# Patient Record
Sex: FEMALE | Race: WHITE | NOT HISPANIC OR LATINO | Employment: OTHER | URBAN - METROPOLITAN AREA
[De-identification: names, ages, dates, MRNs, and addresses within clinical notes are randomized per-mention and may not be internally consistent; named-entity substitution may affect disease eponyms.]

---

## 2019-02-12 ENCOUNTER — HOSPITAL ENCOUNTER (OUTPATIENT)
Dept: GENERAL RADIOLOGY | Facility: HOSPITAL | Age: 39
Discharge: HOME | End: 2019-02-12

## 2019-04-14 ENCOUNTER — HOSPITAL ENCOUNTER (EMERGENCY)
Facility: HOSPITAL | Age: 39
Discharge: HOME | End: 2019-04-14
Admitting: EMERGENCY MEDICINE

## 2019-04-14 VITALS
WEIGHT: 125 LBS | RESPIRATION RATE: 18 BRPM | HEART RATE: 92 BPM | BODY MASS INDEX: 22.15 KG/M2 | DIASTOLIC BLOOD PRESSURE: 65 MMHG | TEMPERATURE: 98.6 F | SYSTOLIC BLOOD PRESSURE: 110 MMHG | OXYGEN SATURATION: 99 % | HEIGHT: 63 IN

## 2019-04-14 DIAGNOSIS — L02.91 ABSCESS: Primary | ICD-10-CM

## 2019-04-14 LAB
ALBUMIN SERPL-MCNC: 3.16 G/DL (ref 3.5–5.2)
ALBUMIN/GLOB SERPL: 0.8 G/DL
ALP SERPL-CCNC: 114 U/L (ref 39–117)
ALT SERPL W P-5'-P-CCNC: 26 U/L (ref 1–33)
ANION GAP SERPL CALCULATED.3IONS-SCNC: 13.1 MMOL/L
AST SERPL-CCNC: 27 U/L (ref 1–32)
BASOPHILS # BLD AUTO: 0.05 10*3/MM3 (ref 0–0.2)
BASOPHILS NFR BLD AUTO: 0.3 % (ref 0–1.5)
BILIRUB SERPL-MCNC: <0.2 MG/DL (ref 0.2–1.2)
BUN BLD-MCNC: 8 MG/DL (ref 6–20)
BUN/CREAT SERPL: 13.1 (ref 7–25)
CALCIUM SPEC-SCNC: 8.6 MG/DL (ref 8.6–10.5)
CHLORIDE SERPL-SCNC: 102 MMOL/L (ref 98–107)
CO2 SERPL-SCNC: 25.9 MMOL/L (ref 22–29)
CREAT BLD-MCNC: 0.61 MG/DL (ref 0.57–1)
CRP SERPL-MCNC: 7 MG/DL (ref 0–0.5)
DEPRECATED RDW RBC AUTO: 38.8 FL (ref 37–54)
EOSINOPHIL # BLD AUTO: 0.24 10*3/MM3 (ref 0–0.4)
EOSINOPHIL NFR BLD AUTO: 1.5 % (ref 0.3–6.2)
ERYTHROCYTE [DISTWIDTH] IN BLOOD BY AUTOMATED COUNT: 12.1 % (ref 12.3–15.4)
ERYTHROCYTE [SEDIMENTATION RATE] IN BLOOD: 53 MM/HR (ref 0–20)
GFR SERPL CREATININE-BSD FRML MDRD: 110 ML/MIN/1.73
GLOBULIN UR ELPH-MCNC: 3.9 GM/DL
GLUCOSE BLD-MCNC: 83 MG/DL (ref 65–99)
HCT VFR BLD AUTO: 35 % (ref 34–46.6)
HGB BLD-MCNC: 11.7 G/DL (ref 12–15.9)
IMM GRANULOCYTES # BLD AUTO: 0.06 10*3/MM3 (ref 0–0.05)
IMM GRANULOCYTES NFR BLD AUTO: 0.4 % (ref 0–0.5)
LYMPHOCYTES # BLD AUTO: 1.88 10*3/MM3 (ref 0.7–3.1)
LYMPHOCYTES NFR BLD AUTO: 11.5 % (ref 19.6–45.3)
MCH RBC QN AUTO: 30 PG (ref 26.6–33)
MCHC RBC AUTO-ENTMCNC: 33.4 G/DL (ref 31.5–35.7)
MCV RBC AUTO: 89.7 FL (ref 79–97)
MONOCYTES # BLD AUTO: 1.12 10*3/MM3 (ref 0.1–0.9)
MONOCYTES NFR BLD AUTO: 6.8 % (ref 5–12)
NEUTROPHILS # BLD AUTO: 13.01 10*3/MM3 (ref 1.4–7)
NEUTROPHILS NFR BLD AUTO: 79.5 % (ref 42.7–76)
PLATELET # BLD AUTO: 485 10*3/MM3 (ref 140–450)
PMV BLD AUTO: 9.6 FL (ref 6–12)
POTASSIUM BLD-SCNC: 3.5 MMOL/L (ref 3.5–5.2)
PROT SERPL-MCNC: 7.1 G/DL (ref 6–8.5)
RBC # BLD AUTO: 3.9 10*6/MM3 (ref 3.77–5.28)
SODIUM BLD-SCNC: 141 MMOL/L (ref 136–145)
WBC NRBC COR # BLD: 16.36 10*3/MM3 (ref 3.4–10.8)

## 2019-04-14 PROCEDURE — 87040 BLOOD CULTURE FOR BACTERIA: CPT | Performed by: NURSE PRACTITIONER

## 2019-04-14 PROCEDURE — 36415 COLL VENOUS BLD VENIPUNCTURE: CPT

## 2019-04-14 PROCEDURE — 86140 C-REACTIVE PROTEIN: CPT | Performed by: NURSE PRACTITIONER

## 2019-04-14 PROCEDURE — 96372 THER/PROPH/DIAG INJ SC/IM: CPT

## 2019-04-14 PROCEDURE — 85025 COMPLETE CBC W/AUTO DIFF WBC: CPT | Performed by: NURSE PRACTITIONER

## 2019-04-14 PROCEDURE — 80053 COMPREHEN METABOLIC PANEL: CPT | Performed by: NURSE PRACTITIONER

## 2019-04-14 PROCEDURE — 25010000002 PROMETHAZINE PER 50 MG: Performed by: NURSE PRACTITIONER

## 2019-04-14 PROCEDURE — 85652 RBC SED RATE AUTOMATED: CPT | Performed by: NURSE PRACTITIONER

## 2019-04-14 PROCEDURE — 25010000002 HYDROMORPHONE 1 MG/ML SOLUTION: Performed by: NURSE PRACTITIONER

## 2019-04-14 PROCEDURE — 25010000002 CEFTRIAXONE PER 250 MG: Performed by: NURSE PRACTITIONER

## 2019-04-14 PROCEDURE — 99283 EMERGENCY DEPT VISIT LOW MDM: CPT

## 2019-04-14 RX ORDER — SODIUM CHLORIDE 0.9 % (FLUSH) 0.9 %
10 SYRINGE (ML) INJECTION AS NEEDED
Status: DISCONTINUED | OUTPATIENT
Start: 2019-04-14 | End: 2019-04-14

## 2019-04-14 RX ORDER — CEFTRIAXONE 1 G/1
1000 INJECTION, POWDER, FOR SOLUTION INTRAMUSCULAR; INTRAVENOUS ONCE
Status: COMPLETED | OUTPATIENT
Start: 2019-04-14 | End: 2019-04-14

## 2019-04-14 RX ORDER — LIDOCAINE HYDROCHLORIDE 10 MG/ML
2.1 INJECTION, SOLUTION EPIDURAL; INFILTRATION; INTRACAUDAL; PERINEURAL ONCE
Status: COMPLETED | OUTPATIENT
Start: 2019-04-14 | End: 2019-04-14

## 2019-04-14 RX ORDER — HYDROCODONE BITARTRATE AND ACETAMINOPHEN 5; 325 MG/1; MG/1
1 TABLET ORAL EVERY 6 HOURS PRN
Qty: 12 TABLET | Refills: 0 | Status: ON HOLD | OUTPATIENT
Start: 2019-04-14 | End: 2019-12-11

## 2019-04-14 RX ORDER — ONDANSETRON 2 MG/ML
4 INJECTION INTRAMUSCULAR; INTRAVENOUS ONCE
Status: DISCONTINUED | OUTPATIENT
Start: 2019-04-14 | End: 2019-04-14

## 2019-04-14 RX ORDER — SULFAMETHOXAZOLE AND TRIMETHOPRIM 800; 160 MG/1; MG/1
TABLET ORAL
Qty: 40 TABLET | Refills: 0 | Status: ON HOLD | OUTPATIENT
Start: 2019-04-14 | End: 2019-12-11

## 2019-04-14 RX ORDER — OXYCODONE AND ACETAMINOPHEN 10; 325 MG/1; MG/1
1 TABLET ORAL ONCE
Status: COMPLETED | OUTPATIENT
Start: 2019-04-14 | End: 2019-04-14

## 2019-04-14 RX ORDER — PROMETHAZINE HYDROCHLORIDE 25 MG/ML
25 INJECTION, SOLUTION INTRAMUSCULAR; INTRAVENOUS ONCE
Status: COMPLETED | OUTPATIENT
Start: 2019-04-14 | End: 2019-04-14

## 2019-04-14 RX ADMIN — PROMETHAZINE HYDROCHLORIDE 25 MG: 25 INJECTION INTRAMUSCULAR; INTRAVENOUS at 11:44

## 2019-04-14 RX ADMIN — LIDOCAINE HYDROCHLORIDE 2.1 ML: 10 INJECTION, SOLUTION EPIDURAL; INFILTRATION; INTRACAUDAL; PERINEURAL at 13:08

## 2019-04-14 RX ADMIN — OXYCODONE HYDROCHLORIDE AND ACETAMINOPHEN 1 TABLET: 10; 325 TABLET ORAL at 13:08

## 2019-04-14 RX ADMIN — CEFTRIAXONE 1000 MG: 1 INJECTION, POWDER, FOR SOLUTION INTRAMUSCULAR; INTRAVENOUS at 13:07

## 2019-04-14 RX ADMIN — HYDROMORPHONE HYDROCHLORIDE 1 MG: 1 INJECTION, SOLUTION INTRAMUSCULAR; INTRAVENOUS; SUBCUTANEOUS at 11:41

## 2019-04-19 LAB
BACTERIA SPEC AEROBE CULT: NORMAL
BACTERIA SPEC AEROBE CULT: NORMAL

## 2019-12-10 ENCOUNTER — HOSPITAL ENCOUNTER (EMERGENCY)
Facility: HOSPITAL | Age: 39
Discharge: PSYCHIATRIC OR SUBSTANCE USE FACILITY | End: 2019-12-11
Attending: FAMILY MEDICINE | Admitting: FAMILY MEDICINE

## 2019-12-10 VITALS
DIASTOLIC BLOOD PRESSURE: 77 MMHG | SYSTOLIC BLOOD PRESSURE: 121 MMHG | WEIGHT: 130 LBS | TEMPERATURE: 98.9 F | HEART RATE: 82 BPM | BODY MASS INDEX: 23.04 KG/M2 | OXYGEN SATURATION: 100 % | RESPIRATION RATE: 18 BRPM | HEIGHT: 63 IN

## 2019-12-10 DIAGNOSIS — F19.10 POLYSUBSTANCE ABUSE (HCC): Primary | ICD-10-CM

## 2019-12-10 LAB
6-ACETYL MORPHINE: NEGATIVE
ALBUMIN SERPL-MCNC: 3.71 G/DL (ref 3.5–5.2)
ALBUMIN/GLOB SERPL: 1.1 G/DL
ALP SERPL-CCNC: 98 U/L (ref 39–117)
ALT SERPL W P-5'-P-CCNC: 54 U/L (ref 1–33)
AMPHET+METHAMPHET UR QL: POSITIVE
ANION GAP SERPL CALCULATED.3IONS-SCNC: 9.9 MMOL/L (ref 5–15)
AST SERPL-CCNC: 34 U/L (ref 1–32)
B-HCG UR QL: NEGATIVE
BACTERIA UR QL AUTO: ABNORMAL /HPF
BARBITURATES UR QL SCN: NEGATIVE
BASOPHILS # BLD AUTO: 0.07 10*3/MM3 (ref 0–0.2)
BASOPHILS NFR BLD AUTO: 0.8 % (ref 0–1.5)
BENZODIAZ UR QL SCN: NEGATIVE
BILIRUB SERPL-MCNC: 0.2 MG/DL (ref 0.2–1.2)
BILIRUB UR QL STRIP: NEGATIVE
BUN BLD-MCNC: 9 MG/DL (ref 6–20)
BUN/CREAT SERPL: 12.9 (ref 7–25)
BUPRENORPHINE SERPL-MCNC: NEGATIVE NG/ML
CALCIUM SPEC-SCNC: 9 MG/DL (ref 8.6–10.5)
CANNABINOIDS SERPL QL: NEGATIVE
CHLORIDE SERPL-SCNC: 105 MMOL/L (ref 98–107)
CLARITY UR: ABNORMAL
CO2 SERPL-SCNC: 25.1 MMOL/L (ref 22–29)
COCAINE UR QL: NEGATIVE
COLOR UR: YELLOW
CREAT BLD-MCNC: 0.7 MG/DL (ref 0.57–1)
DEPRECATED RDW RBC AUTO: 42.4 FL (ref 37–54)
EOSINOPHIL # BLD AUTO: 0.24 10*3/MM3 (ref 0–0.4)
EOSINOPHIL NFR BLD AUTO: 2.7 % (ref 0.3–6.2)
ERYTHROCYTE [DISTWIDTH] IN BLOOD BY AUTOMATED COUNT: 12.8 % (ref 12.3–15.4)
ETHANOL BLD-MCNC: <10 MG/DL (ref 0–10)
ETHANOL UR QL: <0.01 %
GFR SERPL CREATININE-BSD FRML MDRD: 94 ML/MIN/1.73
GLOBULIN UR ELPH-MCNC: 3.5 GM/DL
GLUCOSE BLD-MCNC: 105 MG/DL (ref 65–99)
GLUCOSE UR STRIP-MCNC: NEGATIVE MG/DL
HCT VFR BLD AUTO: 39.2 % (ref 34–46.6)
HGB BLD-MCNC: 12.7 G/DL (ref 12–15.9)
HGB UR QL STRIP.AUTO: NEGATIVE
HYALINE CASTS UR QL AUTO: ABNORMAL /LPF
IMM GRANULOCYTES # BLD AUTO: 0.02 10*3/MM3 (ref 0–0.05)
IMM GRANULOCYTES NFR BLD AUTO: 0.2 % (ref 0–0.5)
KETONES UR QL STRIP: NEGATIVE
LEUKOCYTE ESTERASE UR QL STRIP.AUTO: ABNORMAL
LYMPHOCYTES # BLD AUTO: 3.19 10*3/MM3 (ref 0.7–3.1)
LYMPHOCYTES NFR BLD AUTO: 36.2 % (ref 19.6–45.3)
MAGNESIUM SERPL-MCNC: 1.9 MG/DL (ref 1.6–2.6)
MCH RBC QN AUTO: 29.1 PG (ref 26.6–33)
MCHC RBC AUTO-ENTMCNC: 32.4 G/DL (ref 31.5–35.7)
MCV RBC AUTO: 89.9 FL (ref 79–97)
METHADONE UR QL SCN: NEGATIVE
MONOCYTES # BLD AUTO: 0.71 10*3/MM3 (ref 0.1–0.9)
MONOCYTES NFR BLD AUTO: 8 % (ref 5–12)
NEUTROPHILS # BLD AUTO: 4.59 10*3/MM3 (ref 1.7–7)
NEUTROPHILS NFR BLD AUTO: 52.1 % (ref 42.7–76)
NITRITE UR QL STRIP: NEGATIVE
NRBC BLD AUTO-RTO: 0 /100 WBC (ref 0–0.2)
OPIATES UR QL: NEGATIVE
OXYCODONE UR QL SCN: NEGATIVE
PCP UR QL SCN: NEGATIVE
PH UR STRIP.AUTO: <=5 [PH] (ref 5–8)
PLATELET # BLD AUTO: 304 10*3/MM3 (ref 140–450)
PMV BLD AUTO: 9.7 FL (ref 6–12)
POTASSIUM BLD-SCNC: 4.3 MMOL/L (ref 3.5–5.2)
PROT SERPL-MCNC: 7.2 G/DL (ref 6–8.5)
PROT UR QL STRIP: NEGATIVE
RBC # BLD AUTO: 4.36 10*6/MM3 (ref 3.77–5.28)
RBC # UR: ABNORMAL /HPF
REF LAB TEST METHOD: ABNORMAL
SODIUM BLD-SCNC: 140 MMOL/L (ref 136–145)
SP GR UR STRIP: 1.02 (ref 1–1.03)
SQUAMOUS #/AREA URNS HPF: ABNORMAL /HPF
UROBILINOGEN UR QL STRIP: ABNORMAL
WBC NRBC COR # BLD: 8.82 10*3/MM3 (ref 3.4–10.8)
WBC UR QL AUTO: ABNORMAL /HPF

## 2019-12-10 PROCEDURE — 80307 DRUG TEST PRSMV CHEM ANLYZR: CPT | Performed by: PHYSICIAN ASSISTANT

## 2019-12-10 PROCEDURE — 99284 EMERGENCY DEPT VISIT MOD MDM: CPT

## 2019-12-10 PROCEDURE — 81025 URINE PREGNANCY TEST: CPT | Performed by: PHYSICIAN ASSISTANT

## 2019-12-10 PROCEDURE — 83735 ASSAY OF MAGNESIUM: CPT | Performed by: PHYSICIAN ASSISTANT

## 2019-12-10 PROCEDURE — 36415 COLL VENOUS BLD VENIPUNCTURE: CPT

## 2019-12-10 PROCEDURE — 80053 COMPREHEN METABOLIC PANEL: CPT | Performed by: PHYSICIAN ASSISTANT

## 2019-12-10 PROCEDURE — 85025 COMPLETE CBC W/AUTO DIFF WBC: CPT | Performed by: PHYSICIAN ASSISTANT

## 2019-12-10 PROCEDURE — 81001 URINALYSIS AUTO W/SCOPE: CPT | Performed by: PHYSICIAN ASSISTANT

## 2019-12-11 ENCOUNTER — HOSPITAL ENCOUNTER (INPATIENT)
Facility: HOSPITAL | Age: 39
LOS: 5 days | Discharge: HOME | End: 2019-12-16
Attending: PSYCHIATRY & NEUROLOGY | Admitting: PSYCHIATRY & NEUROLOGY

## 2019-12-11 PROBLEM — N73.9 PID (PELVIC INFLAMMATORY DISEASE): Status: ACTIVE | Noted: 2019-12-11

## 2019-12-11 PROBLEM — F15.20 METHAMPHETAMINE USE DISORDER, SEVERE (HCC): Status: ACTIVE | Noted: 2019-12-11

## 2019-12-11 PROBLEM — F17.200 TOBACCO USE DISORDER: Status: ACTIVE | Noted: 2019-12-11

## 2019-12-11 PROBLEM — F11.20 OPIOID USE DISORDER, SEVERE, DEPENDENCE: Status: ACTIVE | Noted: 2019-12-11

## 2019-12-11 PROBLEM — F19.20 POLYSUBSTANCE DEPENDENCE: Status: ACTIVE | Noted: 2019-12-11

## 2019-12-11 PROCEDURE — 25010000002 INFLUENZA VAC SUBUNIT QUAD 0.5 ML SUSPENSION PREFILLED SYRINGE: Performed by: PSYCHIATRY & NEUROLOGY

## 2019-12-11 PROCEDURE — 90674 CCIIV4 VAC NO PRSV 0.5 ML IM: CPT | Performed by: PSYCHIATRY & NEUROLOGY

## 2019-12-11 PROCEDURE — 93005 ELECTROCARDIOGRAM TRACING: CPT | Performed by: PSYCHIATRY & NEUROLOGY

## 2019-12-11 PROCEDURE — 93010 ELECTROCARDIOGRAM REPORT: CPT | Performed by: INTERNAL MEDICINE

## 2019-12-11 PROCEDURE — G0008 ADMIN INFLUENZA VIRUS VAC: HCPCS | Performed by: PSYCHIATRY & NEUROLOGY

## 2019-12-11 PROCEDURE — 99223 1ST HOSP IP/OBS HIGH 75: CPT | Performed by: PSYCHIATRY & NEUROLOGY

## 2019-12-11 PROCEDURE — HZ2ZZZZ DETOXIFICATION SERVICES FOR SUBSTANCE ABUSE TREATMENT: ICD-10-PCS | Performed by: PSYCHIATRY & NEUROLOGY

## 2019-12-11 RX ORDER — BENZONATATE 100 MG/1
100 CAPSULE ORAL 3 TIMES DAILY PRN
Status: DISCONTINUED | OUTPATIENT
Start: 2019-12-11 | End: 2019-12-16 | Stop reason: HOSPADM

## 2019-12-11 RX ORDER — ONDANSETRON 4 MG/1
4 TABLET, FILM COATED ORAL EVERY 6 HOURS PRN
Status: DISCONTINUED | OUTPATIENT
Start: 2019-12-11 | End: 2019-12-16 | Stop reason: HOSPADM

## 2019-12-11 RX ORDER — CLONIDINE HYDROCHLORIDE 0.1 MG/1
0.1 TABLET ORAL 3 TIMES DAILY PRN
Status: ACTIVE | OUTPATIENT
Start: 2019-12-12 | End: 2019-12-13

## 2019-12-11 RX ORDER — CLONIDINE HYDROCHLORIDE 0.1 MG/1
0.1 TABLET ORAL 4 TIMES DAILY PRN
Status: ACTIVE | OUTPATIENT
Start: 2019-12-11 | End: 2019-12-12

## 2019-12-11 RX ORDER — ALUMINA, MAGNESIA, AND SIMETHICONE 2400; 2400; 240 MG/30ML; MG/30ML; MG/30ML
15 SUSPENSION ORAL EVERY 6 HOURS PRN
Status: DISCONTINUED | OUTPATIENT
Start: 2019-12-11 | End: 2019-12-16 | Stop reason: HOSPADM

## 2019-12-11 RX ORDER — CYCLOBENZAPRINE HCL 10 MG
10 TABLET ORAL 3 TIMES DAILY PRN
Status: DISPENSED | OUTPATIENT
Start: 2019-12-11 | End: 2019-12-16

## 2019-12-11 RX ORDER — PHENAZOPYRIDINE HYDROCHLORIDE 200 MG/1
200 TABLET, FILM COATED ORAL 2 TIMES DAILY PRN
COMMUNITY
End: 2019-12-16 | Stop reason: HOSPADM

## 2019-12-11 RX ORDER — CLONIDINE HYDROCHLORIDE 0.1 MG/1
0.1 TABLET ORAL 2 TIMES DAILY PRN
Status: ACTIVE | OUTPATIENT
Start: 2019-12-13 | End: 2019-12-14

## 2019-12-11 RX ORDER — METRONIDAZOLE 500 MG/1
500 TABLET ORAL 2 TIMES DAILY
COMMUNITY
End: 2019-12-16 | Stop reason: HOSPADM

## 2019-12-11 RX ORDER — ONDANSETRON 4 MG/1
4 TABLET, ORALLY DISINTEGRATING ORAL EVERY 4 HOURS PRN
COMMUNITY
End: 2019-12-16 | Stop reason: HOSPADM

## 2019-12-11 RX ORDER — BENZTROPINE MESYLATE 1 MG/1
2 TABLET ORAL ONCE AS NEEDED
Status: DISCONTINUED | OUTPATIENT
Start: 2019-12-11 | End: 2019-12-16 | Stop reason: HOSPADM

## 2019-12-11 RX ORDER — METRONIDAZOLE 250 MG/1
500 TABLET ORAL EVERY 12 HOURS SCHEDULED
Status: DISCONTINUED | OUTPATIENT
Start: 2019-12-11 | End: 2019-12-16 | Stop reason: HOSPADM

## 2019-12-11 RX ORDER — TRAZODONE HYDROCHLORIDE 50 MG/1
50 TABLET ORAL NIGHTLY PRN
Status: DISCONTINUED | OUTPATIENT
Start: 2019-12-11 | End: 2019-12-16 | Stop reason: HOSPADM

## 2019-12-11 RX ORDER — BENZTROPINE MESYLATE 1 MG/ML
1 INJECTION INTRAMUSCULAR; INTRAVENOUS ONCE AS NEEDED
Status: DISCONTINUED | OUTPATIENT
Start: 2019-12-11 | End: 2019-12-16 | Stop reason: HOSPADM

## 2019-12-11 RX ORDER — CLONIDINE HYDROCHLORIDE 0.1 MG/1
0.1 TABLET ORAL DAILY PRN
Status: ACTIVE | OUTPATIENT
Start: 2019-12-14 | End: 2019-12-15

## 2019-12-11 RX ORDER — DOXYCYCLINE 100 MG/1
100 CAPSULE ORAL EVERY 12 HOURS SCHEDULED
Status: DISCONTINUED | OUTPATIENT
Start: 2019-12-11 | End: 2019-12-16 | Stop reason: HOSPADM

## 2019-12-11 RX ORDER — ECHINACEA PURPUREA EXTRACT 125 MG
2 TABLET ORAL AS NEEDED
Status: DISCONTINUED | OUTPATIENT
Start: 2019-12-11 | End: 2019-12-16 | Stop reason: HOSPADM

## 2019-12-11 RX ORDER — HYDROXYZINE 50 MG/1
50 TABLET, FILM COATED ORAL EVERY 6 HOURS PRN
Status: DISCONTINUED | OUTPATIENT
Start: 2019-12-11 | End: 2019-12-16 | Stop reason: HOSPADM

## 2019-12-11 RX ORDER — DICYCLOMINE HYDROCHLORIDE 10 MG/1
10 CAPSULE ORAL 3 TIMES DAILY PRN
Status: DISPENSED | OUTPATIENT
Start: 2019-12-11 | End: 2019-12-16

## 2019-12-11 RX ORDER — IBUPROFEN 400 MG/1
400 TABLET ORAL EVERY 6 HOURS PRN
Status: DISCONTINUED | OUTPATIENT
Start: 2019-12-11 | End: 2019-12-16 | Stop reason: HOSPADM

## 2019-12-11 RX ORDER — PHENAZOPYRIDINE HYDROCHLORIDE 200 MG/1
200 TABLET, FILM COATED ORAL 2 TIMES DAILY PRN
Status: DISCONTINUED | OUTPATIENT
Start: 2019-12-11 | End: 2019-12-16 | Stop reason: HOSPADM

## 2019-12-11 RX ORDER — DOXYCYCLINE HYCLATE 100 MG
100 TABLET ORAL 2 TIMES DAILY
COMMUNITY
End: 2019-12-16 | Stop reason: HOSPADM

## 2019-12-11 RX ORDER — FAMOTIDINE 20 MG/1
20 TABLET, FILM COATED ORAL 2 TIMES DAILY PRN
Status: DISCONTINUED | OUTPATIENT
Start: 2019-12-11 | End: 2019-12-16 | Stop reason: HOSPADM

## 2019-12-11 RX ORDER — LOPERAMIDE HYDROCHLORIDE 2 MG/1
2 CAPSULE ORAL
Status: DISCONTINUED | OUTPATIENT
Start: 2019-12-11 | End: 2019-12-16 | Stop reason: HOSPADM

## 2019-12-11 RX ADMIN — DICYCLOMINE HYDROCHLORIDE 10 MG: 10 CAPSULE ORAL at 00:54

## 2019-12-11 RX ADMIN — ONDANSETRON HYDROCHLORIDE 4 MG: 4 TABLET, FILM COATED ORAL at 21:11

## 2019-12-11 RX ADMIN — DOXYCYCLINE 100 MG: 100 CAPSULE ORAL at 21:10

## 2019-12-11 RX ADMIN — INFLUENZA A VIRUS A/SINGAPORE/GP1908/2015 IVR-180 (H1N1) ANTIGEN (MDCK CELL DERIVED, PROPIOLACTONE INACTIVATED), INFLUENZA A VIRUS A/NORTH CAROLINA/04/2016 (H3N2) HEMAGGLUTININ ANTIGEN (MDCK CELL DERIVED, PROPIOLACTONE INACTIVATED), INFLUENZA B VIRUS B/IOWA/06/2017 HEMAGGLUTININ ANTIGEN (MDCK CELL DERIVED, PROPIOLACTONE INACTIVATED), INFLUENZA B VIRUS B/SINGAPORE/INFTT-16-0610/2016 HEMAGGLUTININ ANTIGEN (MDCK CELL DERIVED, PROPIOLACTONE INACTIVATED) 0.5 ML: 15; 15; 15; 15 INJECTION, SUSPENSION INTRAMUSCULAR at 15:04

## 2019-12-11 RX ADMIN — IBUPROFEN 400 MG: 400 TABLET, FILM COATED ORAL at 21:11

## 2019-12-11 RX ADMIN — IBUPROFEN 400 MG: 400 TABLET, FILM COATED ORAL at 09:06

## 2019-12-11 RX ADMIN — DICYCLOMINE HYDROCHLORIDE 10 MG: 10 CAPSULE ORAL at 09:06

## 2019-12-11 RX ADMIN — METRONIDAZOLE 500 MG: 250 TABLET ORAL at 21:10

## 2019-12-11 RX ADMIN — HYDROXYZINE HYDROCHLORIDE 50 MG: 50 TABLET ORAL at 00:54

## 2019-12-11 RX ADMIN — IBUPROFEN 400 MG: 400 TABLET, FILM COATED ORAL at 15:05

## 2019-12-11 RX ADMIN — TRAZODONE HYDROCHLORIDE 50 MG: 50 TABLET ORAL at 00:54

## 2019-12-11 RX ADMIN — ONDANSETRON HYDROCHLORIDE 4 MG: 4 TABLET, FILM COATED ORAL at 00:54

## 2019-12-11 RX ADMIN — TRAZODONE HYDROCHLORIDE 50 MG: 50 TABLET ORAL at 21:12

## 2019-12-11 NOTE — NURSING NOTE
Phone contact dr ac. Presented assess/clinicals/labs. Orders received. Admit to [facility]. Routine orders. Clonidine detox admission day today. Orders read back and confirmed.

## 2019-12-11 NOTE — PROGRESS NOTES
"DATA:         Therapist met individually with patient this date to introduce role and to discuss hospitalization expectations. Patient agreeable.     Roro is a 39 year old single,  female [place].  She presents as voluntary admit requesting detox from suboxone and meth.  Patient reports she has been smoking and snorting meth of 1 gram daily and suboxone 1 pill daily for past two years.  She reports motivation for sobriety to be her father passing away one week ago and that he was unable to see her sober.  Patient reports substance abuse problem since age 31.  She denies history of detox treatment.  Patient discussed stressor of being around drug culture, financial strain, limited support system.  She is 8th grade educated and disabled.  Patient denied legal issues.  She reports \"horrible\" withdrawal symptoms of \"hurting all over,\" sweating, feeling hot/cold, nausea, diarrhea, fatigue, body aches, headaches, agitation, anxiety, and strong cravings for suboxone.  Patient's initial COWS score was 13.  She denied SI, HI, and AVH.  Patient reports history of depression and anxiety.  She denies any acute treatment.  Patient oriented x3 with poor insight and poor impulse control.  She reports plan to attend rehab upon discharge.  Patient admitted for detox regime.       Clinical Maneuvering/Intervention:     Therapist assisted patient in processing above session content; acknowledged and normalized patient’s thoughts, feelings, and concerns.  Discussed the therapist/patient relationship and explain the parameters and limitations of relative confidentiality.  Also discussed the importance active participation, and honesty to the treatment process.  Encouraged the patient to discuss/vent her feelings, frustrations, and fears concerning her ongoing medical issues and validated her feelings.     Discussed the importance of finding enjoyable activities and coping skills that the patient can engage " in a regular basis. Discussed healthy coping skills such as distraction, self love, grounding, thought challenges/reframing, etc.  Provided patient with list of healthy coping skills this date. Discussed the importance of medication compliance.  Praised the patient for seeking help and spent the majority of the session building rapport.       Allowed patient to freely discuss issues without interruption or judgment. Provided safe, confidential environment to facilitate the development of positive therapeutic relationship and encourage open, honest communication.      Therapist addressed discharge safety planning this date. Assisted patient in identifying risk factors which would indicate the need for higher level of care after discharge;  including thoughts to harm self or others and/or self-harming behavior. Encouraged patient to call 911, or present to the nearest emergency room should any of these events occur. Discussed crisis intervention services and means to access.  Encouraged securing any objects of harm.       Therapist completed integrated summary, treatment plan, and initiated social history this date.  Therapist is strongly encouraging family involvement in treatment.      Patient agreeable to involve boyfriend with treatment and attempted to contact in session, however appeared his phone is disconnected at this time.  Patient stated he does not have money to put minutes on his phone.  She declined to contact any family members.     ASSESSMENT:      Patient displayed labile affect and irritable mood.  She displayed pressured, rapid speech and poor insight.  She reported poor sleep and poor appetite.     PLAN:       Patient to remain hospitalized this date.     Treatment team will focus efforts on stabilizing patient's acute symptoms while providing education on healthy coping and crisis management to reduce hospitalizations.   Patient requires daily psychiatrist evaluation and 24/7 nursing supervision  to promote patient  safety.     Therapist will offer 1-4 individual sessions (20-30 minutes each), 1 therapy group daily, family education, and appropriate referral.    Patient consented for [facility] referral.

## 2019-12-11 NOTE — NURSING NOTE
Pt status remains unchanged. Given warm blanket, sandwich snack and Gatorade. Continue to monitor.

## 2019-12-11 NOTE — ED PROVIDER NOTES
Subjective     History provided by:  Patient   used: No    Mental Health Problem   Presenting symptoms comment:  Pt wants to seek detox from meth and suboxone. Pt has a bed at [facility]. Denies SI/HI,AVH.  Timing:  Constant  Progression:  Worsening  Chronicity:  New  Context: drug abuse    Associated symptoms: anxiety    Risk factors: hx of mental illness        Review of Systems   Constitutional: Negative.    HENT: Negative.    Eyes: Negative.    Respiratory: Negative.    Cardiovascular: Negative.    Gastrointestinal: Negative.    Endocrine: Negative.    Genitourinary: Negative.    Musculoskeletal: Negative.    Skin: Negative.    Allergic/Immunologic: Negative.    Neurological: Negative.    Hematological: Negative.    Psychiatric/Behavioral: The patient is nervous/anxious.    All other systems reviewed and are negative.      Past Medical History:   Diagnosis Date   • PID (pelvic inflammatory disease)    • Substance abuse (CMS/HCC)    • Withdrawal symptoms, drug or narcotic (CMS/HCC)        No Known Allergies    Past Surgical History:   Procedure Laterality Date   •  SECTION     • TUBAL ABDOMINAL LIGATION         Family History   Problem Relation Age of Onset   • Drug abuse Sister    • Drug abuse Brother        Social History     Socioeconomic History   • Marital status: Legally      Spouse name: Not on file   • Number of children: Not on file   • Years of education: Not on file   • Highest education level: Not on file   Tobacco Use   • Smoking status: Current Every Day Smoker     Packs/day: 1.00     Years: 24.00     Pack years: 24.00     Types: Cigarettes   • Smokeless tobacco: Never Used   Substance and Sexual Activity   • Alcohol use: Never     Frequency: Never   • Drug use: Yes     Types: Methamphetamines, Other     Comment: suboxone   • Sexual activity: Yes     Partners: Male           Objective   Physical Exam   Constitutional: She is oriented to person, place,  and time. She appears well-developed and well-nourished.   HENT:   Head: Normocephalic and atraumatic.   Right Ear: External ear normal.   Left Ear: External ear normal.   Nose: Nose normal.   Mouth/Throat: Oropharynx is clear and moist.   Eyes: Pupils are equal, round, and reactive to light. Conjunctivae and EOM are normal.   Neck: Normal range of motion. Neck supple.   Cardiovascular: Normal rate, regular rhythm, normal heart sounds and intact distal pulses.   Pulmonary/Chest: Effort normal and breath sounds normal.   Abdominal: Soft. Bowel sounds are normal.   Musculoskeletal: Normal range of motion.   Neurological: She is alert and oriented to person, place, and time.   Skin: Skin is warm and dry. Capillary refill takes less than 2 seconds.   Psychiatric: Her speech is normal and behavior is normal. Judgment and thought content normal. Her mood appears anxious. Cognition and memory are normal.   Nursing note and vitals reviewed.      Procedures           ED Course                      No data recorded                        MDM    Final diagnoses:   Polysubstance abuse (CMS/[facility])              Candelaria Shepherd PA  12/11/19 0244

## 2019-12-11 NOTE — NURSING NOTE
"Presented to ED requesting detox.  Reports that she has had an addiction problem since she was 31.  Reports experiencing several traumas that year that led to substance abuse.  Has been using meth and suboxone for the last 2 years.  Reports that she smokes both meth and suboxone-suboxone 1 pill daily, and meth up to a gram daily.  Reports that her father passed away last week and all he wanted was to see her clean.  Also, reports that she has grandchildren, and she wants to be involved in their lives, and her significant other isn't \"going to put up with\" her substance abuse any longer.  Denies any hx of detox admissions.  Plans to go to [facility] upon discharge.    "

## 2019-12-11 NOTE — PLAN OF CARE
Problem: Patient Care Overview  Goal: Plan of Care Review  Outcome: Ongoing (interventions implemented as appropriate)  Flowsheets (Taken 12/11/2019 0305)  Progress: no change  Plan of Care Reviewed With: patient  Patient Agreement with Plan of Care: agrees  Outcome Summary: Pt new admit this shift. No change. 12/11/19 0303
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
## 2019-12-11 NOTE — NURSING NOTE
Spoke with david in pharmacy regarding medication verification. She states that they will call [facility] and inquire on scripts.

## 2019-12-11 NOTE — PLAN OF CARE
Problem: Patient Care Overview  Goal: Plan of Care Review  Outcome: Ongoing (interventions implemented as appropriate)  Flowsheets (Taken 12/11/2019 1433)  Plan of Care Reviewed With: patient  Patient Agreement with Plan of Care: agrees  Outcome Summary: Completed initial assessment, discussed alternative aftercare resources and expectations of treatment; reviewed treatment plan.     Problem: Patient Care Overview  Goal: Individualization and Mutuality  Outcome: Ongoing (interventions implemented as appropriate)  Flowsheets (Taken 12/11/2019 1433)  Patient Personal Strengths: expressive of emotions; expressive of needs; family/social support; motivated for treatment; resilient; resourceful  Patient Vulnerabilities: Ineffective coping skills, poor insight, substance abuse.  Patient Specific Goals (Include Timeframe): Patient to identify 3 relapse triggers, 3 healthy coping skills, complete relapse prevention plan, and complete detox regime.  Patient Specific Interventions: Patient to access psychiatric evaluation, medication management, individual and group CBT therapy sessions.  What Information Would Help Us Give You More Personalized Care?: None  What Anxieties, Fears, Concerns, or Questions Do You Have About Your Care?: None  Patient Specific Preferences: Detox regime.     Problem: Patient Care Overview  Goal: Discharge Needs Assessment  Outcome: Ongoing (interventions implemented as appropriate)  Flowsheets (Taken 12/11/2019 1433)  Outpatient/Agency/Support Group Needs: residential services  Discharge Coordination/Progress: Patient anticipated to attend [facility] upon discharge.  She has insurance for medications.  Transportation Anticipated: public transportation  Anticipated Discharge Disposition: inpatient rehab facility  Current Discharge Risk: psychiatric illness; substance use/abuse; financial support inadequate  Concerns to be Addressed: coping/stress; decision making; discharge planning;  substance/tobacco abuse/use; medication; mental health; financial/insurance  Readmission Within the Last 30 Days: no previous admission in last 30 days  Patient/Family Anticipated Services at Transition: rehabilitation services  Patient's Choice of Community Agency(s): Anticipate rehab referral to [facility].  Patient/Family Anticipates Transition to: inpatient rehabilitation facility     Problem: Patient Care Overview  Goal: Interprofessional Rounds/Family Conf  Outcome: Ongoing (interventions implemented as appropriate)  Flowsheets (Taken 12/11/2019 1433)  Participants: social work; psychiatrist; milieu/psych techs; nursing  Summary: Therapist to staff patient's  case with treatment team during admission.     Problem: Overarching Goals (Adult)  Goal: Adheres to Safety Considerations for Self and Others  Outcome: Ongoing (interventions implemented as appropriate)     Problem: Overarching Goals (Adult)  Goal: Adheres to Safety Considerations for Self and Others  Intervention: Develop and Maintain Individualized Safety Plan  Flowsheets (Taken 12/11/2019 1433)  Safety Measures: clinical history reviewed     Problem: Overarching Goals (Adult)  Goal: Optimized Coping Skills in Response to Life Stressors  Outcome: Ongoing (interventions implemented as appropriate)     Problem: Overarching Goals (Adult)  Goal: Optimized Coping Skills in Response to Life Stressors  Intervention: Promote Effective Coping Strategies  Flowsheets (Taken 12/11/2019 1433)  Supportive Measures: active listening utilized; counseling provided; goal setting facilitated; problem solving facilitated; relaxation techniques promoted; verbalization of feelings encouraged; self-responsibility promoted; self-reflection promoted; self-care encouraged     Problem: Overarching Goals (Adult)  Goal: Develops/Participates in Therapeutic [place] to Support Successful Transition  Outcome: Ongoing (interventions implemented as appropriate)     Problem:  Overarching Goals (Adult)  Goal: Develops/Participates in Therapeutic [place] to Support Successful Transition  Intervention: Foster Therapeutic [place]  Flowsheets (Taken 12/11/2019 1433)  Trust Relationship/Rapport: emotional support provided; empathic listening provided; reassurance provided; questions encouraged; questions answered; thoughts/feelings acknowledged     Problem: Overarching Goals (Adult)  Goal: Develops/Participates in Therapeutic [place] to Support Successful Transition  Intervention: Mutually Develop Transition Plan  Flowsheets (Taken 12/11/2019 1433)  Transition Support: community resources reviewed; crisis management plan promoted; crisis management plan verbalized; follow-up care discussed; follow-up care coordinated     Problem: Impaired Control (Excessive Substance Use) (Adult)  Goal: Participates in Recovery Program (Excessive Substance Use)  Outcome: Ongoing (interventions implemented as appropriate)     Problem: Impaired Control (Excessive Substance Use) (Adult)  Intervention: Promote Optimal Psychological Functioning  Flowsheets (Taken 12/11/2019 1433)  Mutually Determined Action Steps (Promote Optimal Psychological Functioning): discusses ongoing recovery plan; identifies support resources; identifies past trauma episode  Trust Relationship/Rapport: emotional support provided; empathic listening provided; reassurance provided; questions encouraged; questions answered; thoughts/feelings acknowledged

## 2019-12-11 NOTE — NURSING NOTE
Pt presents to intake requesting detox off suboxone and meth. States she contacted [facility] today and they have voucher from [employer] to pay for her rehab, but she has to complete detox prog first. They referred her here.   Pt searched per staff, placed in hosp scrubs, and belongings secured and placed in cabinet.   Pt in rm 5, monitored for safety checks.   Advised of delay in assess process due to high volume of pts at this time. NAD noted or reported.

## 2019-12-12 PROCEDURE — 99232 SBSQ HOSP IP/OBS MODERATE 35: CPT | Performed by: PSYCHIATRY & NEUROLOGY

## 2019-12-12 RX ADMIN — DICYCLOMINE HYDROCHLORIDE 10 MG: 10 CAPSULE ORAL at 21:26

## 2019-12-12 RX ADMIN — CYCLOBENZAPRINE 10 MG: 10 TABLET, FILM COATED ORAL at 21:26

## 2019-12-12 RX ADMIN — DOXYCYCLINE 100 MG: 100 CAPSULE ORAL at 08:29

## 2019-12-12 RX ADMIN — HYDROXYZINE HYDROCHLORIDE 50 MG: 50 TABLET ORAL at 21:26

## 2019-12-12 RX ADMIN — IBUPROFEN 400 MG: 400 TABLET, FILM COATED ORAL at 21:26

## 2019-12-12 RX ADMIN — METRONIDAZOLE 500 MG: 250 TABLET ORAL at 08:28

## 2019-12-12 RX ADMIN — IBUPROFEN 400 MG: 400 TABLET, FILM COATED ORAL at 08:30

## 2019-12-12 RX ADMIN — METRONIDAZOLE 500 MG: 250 TABLET ORAL at 21:25

## 2019-12-12 RX ADMIN — DOXYCYCLINE 100 MG: 100 CAPSULE ORAL at 21:26

## 2019-12-12 RX ADMIN — TRAZODONE HYDROCHLORIDE 50 MG: 50 TABLET ORAL at 21:26

## 2019-12-12 NOTE — PLAN OF CARE
Problem: Patient Care Overview  Goal: Plan of Care Review  Outcome: Ongoing (interventions implemented as appropriate)  Flowsheets (Taken 12/12/2019 0105)  Progress: improving  Plan of Care Reviewed With: patient  Patient Agreement with Plan of Care: agrees  Note:   Patient calm and cooperative. Denies anxiety, depression, SI,HI, Castillo., cravings, or withdrawal symptoms. Patient c/o aching and cramping in her stomach 7/10. No other issues noted at this time. Will continue to monitor.

## 2019-12-12 NOTE — PROGRESS NOTES
"INPATIENT PSYCHIATRIC PROGRESS NOTE    Name:  Roro French  :  1980  MRN:  [id number]  Visit Number:  [id number]  Length of stay:  1    SUBJECTIVE  CC/Focus of Exam: withdrawals    INTERVAL HISTORY:  The patient states she is feeling better and the detox medications are helping.   Depression rating 10  Anxiety rating 1/10  Sleep: good  Withdrawal sx: denies  Cravin/10 for anything    Review of Systems   Constitutional: Negative.    Respiratory: Negative.    Cardiovascular: Negative.    Gastrointestinal: Negative.        OBJECTIVE    Temp:  [97.8 °F (36.6 °C)-98.7 °F (37.1 °C)] 98.1 °F (36.7 °C)  Heart Rate:  [] 109  Resp:  [16-18] 18  BP: ()/(53-79) 112/74    MENTAL STATUS EXAM:  Appearance:Casually dressed, good hygeine.   Cooperation:Cooperative  Psychomotor: No psychomotor agitation/retardation, No EPS, No motor tics  Speech-normal rate, amount.  Mood \"good\"   Affect-congruent, appropriate, stable  Thought Content-goal directed, no delusional material present  Thought process-linear, organized.  Suicidality: No SI  Homicidality: No HI  Perception: No AH/VH  Insight-fair   Judgement-fair    Lab Results (last 24 hours)     ** No results found for the last 24 hours. **             Imaging Results (Last 24 Hours)     ** No results found for the last 24 hours. **             ECG/EMG Results (most recent)     Procedure Component Value Units Date/Time    ECG 12 Lead [[id number]] Collected:  19     Updated:  19    Narrative:       Test Reason : Baseline Cardiac Status  Blood Pressure : **/** mmHG  Vent. Rate : 073 BPM     Atrial Rate : 073 BPM     P-R Int : 140 ms          QRS Dur : 078 ms      QT Int : 392 ms       P-R-T Axes : 078 050 063 degrees     QTc Int : 431 ms    Normal sinus rhythm with sinus arrhythmia  Normal ECG  No previous ECGs available  Confirmed by Brando Hunt ([id number]) on 2019 7:22:48 PM    Referred By:             Confirmed By:Brando Hunt       "     ALLERGIES: Patient has no known allergies.      Current Facility-Administered Medications:   •  aluminum-magnesium hydroxide-simethicone (MAALOX MAX) 400-400-40 MG/5ML suspension 15 mL, 15 mL, Oral, Q6H PRN, Melissa Husain MD  •  benzonatate (TESSALON) capsule 100 mg, 100 mg, Oral, TID PRN, Melissa Husain MD  •  benztropine (COGENTIN) tablet 2 mg, 2 mg, Oral, Once PRN **OR** benztropine (COGENTIN) injection 1 mg, 1 mg, Intramuscular, Once PRN, Melissa Husain MD  •  [] cloNIDine (CATAPRES) tablet 0.1 mg, 0.1 mg, Oral, 4x Daily PRN **FOLLOWED BY** cloNIDine (CATAPRES) tablet 0.1 mg, 0.1 mg, Oral, TID PRN **FOLLOWED BY** [START ON 2019] cloNIDine (CATAPRES) tablet 0.1 mg, 0.1 mg, Oral, BID PRN **FOLLOWED BY** [START ON 2019] cloNIDine (CATAPRES) tablet 0.1 mg, 0.1 mg, Oral, Daily PRN, Melissa Husain MD  •  cyclobenzaprine (FLEXERIL) tablet 10 mg, 10 mg, Oral, TID PRN, Melissa Husain MD  •  dicyclomine (BENTYL) capsule 10 mg, 10 mg, Oral, TID PRN, Melissa Husain MD, 10 mg at 19 0906  •  doxycycline (MONODOX) capsule 100 mg, 100 mg, Oral, Q12H, Melissa Husain MD, 100 mg at 19 0829  •  famotidine (PEPCID) tablet 20 mg, 20 mg, Oral, BID PRN, Melissa Husain MD  •  hydrOXYzine (ATARAX) tablet 50 mg, 50 mg, Oral, Q6H PRN, Melissa Husain MD, 50 mg at 19 0054  •  ibuprofen (ADVIL,MOTRIN) tablet 400 mg, 400 mg, Oral, Q6H PRN, Melissa Husain MD, 400 mg at 19 0830  •  loperamide (IMODIUM) capsule 2 mg, 2 mg, Oral, Q2H PRN, Melissa Husain MD  •  magnesium hydroxide (MILK OF MAGNESIA) suspension 2400 mg/10mL 10 mL, 10 mL, Oral, Daily PRN, Melissa Husain MD  •  metroNIDAZOLE (FLAGYL) tablet 500 mg, 500 mg, Oral, Q12H, Melissa Husain MD, 500 mg at 19 0828  •  ondansetron (ZOFRAN) tablet 4 mg, 4 mg, Oral, Q6H PRN, Melissa Husani MD, 4 mg at 19  •  phenazopyridine (PYRIDIUM) tablet 200 mg, 200 mg, Oral, BID PRN, Melissa Husain MD  •  sodium chloride nasal spray 2 spray, 2  spray, Each Nare, Lisha TRIPP Mazhar, MD  •  traZODone (DESYREL) tablet 50 mg, 50 mg, Oral, Nightly PRN, Melissa Husain MD, 50 mg at 12/11/19 2112    ASSESSMENT & PLAN:      Opioid use disorder, severe, dependence (CMS/HCC)  - Clonidine detox      Methamphetamine use disorder, severe (CMS/HCC)  - Supportive treatment      PID (pelvic inflammatory disease)  - Flagyl, Doxycycline      Tobacco use disorder  - Encouraged patient avoid and quit tobacco use.      THC use disorder  - Supportive treatment    Suicide precautions: Suicide precuation Level 4 (q30 min checks)    Behavioral Health Treatment Plan and Problem List: I have reviewed and approved the Behavioral Health Treatment Plan and Problem list.  The patient has had a chance to review and agrees with the treatment plan.     Clinician:  Melissa Husain MD  12/12/19  1:55 PM

## 2019-12-12 NOTE — PLAN OF CARE
"  Problem: Patient Care Overview  Goal: Interprofessional Rounds/Family Conf  Outcome: Ongoing (interventions implemented as appropriate)  Flowsheets (Taken 2019 1608)  Participants: social work; nursing  Summary: Staffed with RN.     Problem: Overarching Goals (Adult)  Goal: Optimized Coping Skills in Response to Life Stressors  Intervention: Promote Effective Coping Strategies  Flowsheets (Taken 2019 1608)  Supportive Measures: self-reflection promoted; self-responsibility promoted; relaxation techniques promoted; self-care encouraged; verbalization of feelings encouraged       DATA:         Therapist met individually with patient this date for inpatient follow up from initial assessment yesterday.  Continued to discuss progress with treatment.  Therapist reviewed patient's chart and provided education on resources for aftercare.  Discussed disposition planning.  Patient appeared agitated and demanding.  She reported having \"awful\" withdrawal symptoms of body aches, feeling hot/cold, nausea, diarrhea, headache, fatigue, poor sleep.  Patient focused on obtaining Subutex and inquired many times.  She reported she did not start feeling this bad until this afternoon.  Patient noted to remain in room most of morning.  Therapist provided emotional support and encouraged patient to complete treatment.  Patient stated \"I know if I leave AMA I'll relapse and [facility].\"  She seemed very easily frustrated with pressured speech.  Therapist encouraged patient to focus on her motivations for coming to detox.  Patient stated she wants to get sober for her dad who  recently but is having a hard time getting through withdrawal symptoms.  Therapist assisted patient to identify and process healthy coping skills and utilize mindfulness techniques; patient somewhat receptive.  She reports plan to attend [facility] upon discharge.        Clinical Maneuvering/Intervention:     Therapist assisted " patient in processing above session content; acknowledged and normalized patient’s thoughts, feelings, and concerns.  Discussed the therapist/patient relationship and explain the parameters and limitations of relative confidentiality.  Also discussed the importance active participation, and honesty to the treatment process.  Encouraged the patient to discuss/vent her feelings, frustrations, and fears concerning ongoing medical issues and validated patient's feelings.     Discussed the importance of finding enjoyable activities and coping skills that the patient can engage in a regular basis. Discussed healthy coping skills such as distraction, self love, grounding, thought challenges/reframing, etc.  Provided patient with list of healthy coping skills this date. Discussed the importance of medication compliance.       Allowed patient to freely discuss issues without interruption or judgment. Provided safe, confidential environment to facilitate the development of positive therapeutic relationship and encourage open, honest communication.       ASSESSMENT:      Patient appeared to display labile affect and irritable mood.  She displayed poor insight.  She denied SI, HI, and AVH.  Patient oriented x3 and reported poor sleep.     PLAN:       Patient to remain hospitalized this date.  Patient has aftercare with [facility].

## 2019-12-13 PROCEDURE — 99232 SBSQ HOSP IP/OBS MODERATE 35: CPT | Performed by: PSYCHIATRY & NEUROLOGY

## 2019-12-13 RX ORDER — BUPRENORPHINE 2 MG/1
2 TABLET SUBLINGUAL 2 TIMES DAILY
Status: COMPLETED | OUTPATIENT
Start: 2019-12-14 | End: 2019-12-14

## 2019-12-13 RX ORDER — BUPRENORPHINE 2 MG/1
2 TABLET SUBLINGUAL DAILY
Status: COMPLETED | OUTPATIENT
Start: 2019-12-15 | End: 2019-12-15

## 2019-12-13 RX ORDER — BUPRENORPHINE 2 MG/1
2 TABLET SUBLINGUAL 2 TIMES DAILY
Status: COMPLETED | OUTPATIENT
Start: 2019-12-13 | End: 2019-12-13

## 2019-12-13 RX ADMIN — IBUPROFEN 400 MG: 400 TABLET, FILM COATED ORAL at 21:16

## 2019-12-13 RX ADMIN — ONDANSETRON HYDROCHLORIDE 4 MG: 4 TABLET, FILM COATED ORAL at 17:00

## 2019-12-13 RX ADMIN — METRONIDAZOLE 500 MG: 250 TABLET ORAL at 21:14

## 2019-12-13 RX ADMIN — DOXYCYCLINE 100 MG: 100 CAPSULE ORAL at 08:11

## 2019-12-13 RX ADMIN — BUPRENORPHINE HCL 2 MG: 2 TABLET SUBLINGUAL at 13:29

## 2019-12-13 RX ADMIN — BUPRENORPHINE HCL 2 MG: 2 TABLET SUBLINGUAL at 21:17

## 2019-12-13 RX ADMIN — METRONIDAZOLE 500 MG: 250 TABLET ORAL at 08:11

## 2019-12-13 RX ADMIN — DOXYCYCLINE 100 MG: 100 CAPSULE ORAL at 21:14

## 2019-12-13 NOTE — PLAN OF CARE
Problem: Patient Care Overview  Goal: Plan of Care Review  Outcome: Ongoing (interventions implemented as appropriate)  Flowsheets (Taken 12/13/2019 0615)  Progress: improving  Plan of Care Reviewed With: patient  Patient Agreement with Plan of Care: agrees  Outcome Summary: Pt reports anxiety 8/10, depression 4/10, cravings 9/10, body ache, hot/cold flashes, and sweating. Tremors +2.  Participated in group and slept well.

## 2019-12-13 NOTE — PLAN OF CARE
Problem: Patient Care Overview  Goal: Plan of Care Review  Outcome: Ongoing (interventions implemented as appropriate)  Flowsheets (Taken 12/13/2019 6462)  Consent Given to Review Plan with: Patient has been out of room most of the day, sleeeping/eating good, anxiety 5, depression 0, denies S/I, H/I & A/V/H ideations.  Cravings o, has w/d cramping, sweating, tossing and turning.  Progress: no change  Plan of Care Reviewed With: patient  Patient Agreement with Plan of Care: agrees

## 2019-12-13 NOTE — PLAN OF CARE
DATA:         Therapist met individually with patient this date for inpatient follow up.  Continued to discuss progress with treatment.  Therapist reviewed patient's chart and provided education on resources for aftercare.  Discussed disposition planning.  Patient reported moderate withdrawal symptoms today.  She seemed less agitated.  She reported withdrawal symptoms of restlessness, body aches , feeling hot/cold, nausea.  Patient reported poor sleep.  She seemed to have some difficulty concentrating.  Patient discussed motivation for rehab to be her  father.  She reported healthy coping skills of exercise and talking to trusted friend.  Therapist reviewed that patient is accepted to [facility] on Monday; patient agreeable.     Clinical Maneuvering/Intervention:     Therapist assisted patient in processing above session content; acknowledged and normalized patient’s thoughts, feelings, and concerns.  Discussed the therapist/patient relationship and explain the parameters and limitations of relative confidentiality.  Also discussed the importance active participation, and honesty to the treatment process.  Encouraged the patient to discuss/vent her feelings, frustrations, and fears concerning ongoing medical issues and validated patient's feelings.     Discussed the importance of finding enjoyable activities and coping skills that the patient can engage in a regular basis. Discussed healthy coping skills such as distraction, self love, grounding, thought challenges/reframing, etc.  Provided patient with list of healthy coping skills this date. Discussed the importance of medication compliance.       Allowed patient to freely discuss issues without interruption or judgment. Provided safe, confidential environment to facilitate the development of positive therapeutic relationship and encourage open, honest communication.       ASSESSMENT:      Patient appeared to display restricted affect and anxious mood.   She denied SI, HI, and AVH.  Patient oriented x3 with limited insight.  She reported poor sleep.     PLAN:       Patient to remain hospitalized this date.  Patient has aftercare with [facility] on Monday and they will transport upon discharge.

## 2019-12-13 NOTE — PROGRESS NOTES
"INPATIENT PSYCHIATRIC PROGRESS NOTE    Name:  Roro French  :  1980  MRN:  [id number]  Visit Number:  [id number]  Length of stay:  2    SUBJECTIVE  CC/Focus of Exam: opioid withdrawals    INTERVAL HISTORY:  The patient states she is feeling worse today than when she came in. States she is hurting all over, feeling restless and irritable, not able to sleep, is feeling nauseated, and has vomited twice today, states bones are hurting, is feeling hot and cold.  Depression rating 1010  Anxiety rating 10/10  Sleep: poor  Withdrawal sx: see HPI  Craving: 10/10 for anything    Review of Systems   Constitutional: Negative.    Respiratory: Negative.    Cardiovascular: Negative.    Gastrointestinal: Negative.        OBJECTIVE    Temp:  [96.9 °F (36.1 °C)-98.2 °F (36.8 °C)] 97.8 °F (36.6 °C)  Heart Rate:  [] 105  Resp:  [18] 18  BP: ()/(64-84) 108/81    MENTAL STATUS EXAM:  Appearance:Casually dressed, good hygeine.   Cooperation:Cooperative  Psychomotor: a bit restless  Speech-normal rate, amount.  Mood \"horrible\"   Affect-congruent, appropriate, stable  Thought Content-goal directed, no delusional material present  Thought process-linear, organized.  Suicidality: No SI  Homicidality: No HI  Perception: No AH/VH  Insight-fair   Judgement-fair    Lab Results (last 24 hours)     ** No results found for the last 24 hours. **             Imaging Results (Last 24 Hours)     ** No results found for the last 24 hours. **             ECG/EMG Results (most recent)     Procedure Component Value Units Date/Time    ECG 12 Lead [[id number]] Collected:  19     Updated:  19    Narrative:       Test Reason : Baseline Cardiac Status  Blood Pressure : **/** mmHG  Vent. Rate : 073 BPM     Atrial Rate : 073 BPM     P-R Int : 140 ms          QRS Dur : 078 ms      QT Int : 392 ms       P-R-T Axes : 078 050 063 degrees     QTc Int : 431 ms    Normal sinus rhythm with sinus arrhythmia  Normal ECG  No " previous ECGs available  Confirmed by Brando Hunt ([id number]) on 2019 7:22:48 PM    Referred By:             Confirmed By:Brando Hunt           ALLERGIES: Patient has no known allergies.      Current Facility-Administered Medications:   •  aluminum-magnesium hydroxide-simethicone (MAALOX MAX) 400-400-40 MG/5ML suspension 15 mL, 15 mL, Oral, Q6H PRN, Melissa Husain MD  •  benzonatate (TESSALON) capsule 100 mg, 100 mg, Oral, TID PRN, Melissa Husain MD  •  benztropine (COGENTIN) tablet 2 mg, 2 mg, Oral, Once PRN **OR** benztropine (COGENTIN) injection 1 mg, 1 mg, Intramuscular, Once PRN, Melissa Husain MD  •  [] cloNIDine (CATAPRES) tablet 0.1 mg, 0.1 mg, Oral, 4x Daily PRN **FOLLOWED BY** [] cloNIDine (CATAPRES) tablet 0.1 mg, 0.1 mg, Oral, TID PRN **FOLLOWED BY** cloNIDine (CATAPRES) tablet 0.1 mg, 0.1 mg, Oral, BID PRN **FOLLOWED BY** [START ON 2019] cloNIDine (CATAPRES) tablet 0.1 mg, 0.1 mg, Oral, Daily PRN, Melissa Husain MD  •  cyclobenzaprine (FLEXERIL) tablet 10 mg, 10 mg, Oral, TID PRN, Melissa Husain MD, 10 mg at 19  •  dicyclomine (BENTYL) capsule 10 mg, 10 mg, Oral, TID PRN, Melissa Husain MD, 10 mg at 19  •  doxycycline (MONODOX) capsule 100 mg, 100 mg, Oral, Q12H, Melissa Husain MD, 100 mg at 1911  •  famotidine (PEPCID) tablet 20 mg, 20 mg, Oral, BID PRN, Melissa Husain MD  •  hydrOXYzine (ATARAX) tablet 50 mg, 50 mg, Oral, Q6H PRN, Melissa Husain MD, 50 mg at 19  •  ibuprofen (ADVIL,MOTRIN) tablet 400 mg, 400 mg, Oral, Q6H PRN, Melissa Husain MD, 400 mg at 19  •  loperamide (IMODIUM) capsule 2 mg, 2 mg, Oral, Q2H PRN, Melissa Husain MD  •  magnesium hydroxide (MILK OF MAGNESIA) suspension 2400 mg/10mL 10 mL, 10 mL, Oral, Daily PRN, Melissa Husain MD  •  metroNIDAZOLE (FLAGYL) tablet 500 mg, 500 mg, Oral, Q12H, Melissa Husain MD, 500 mg at 19 0811  •  ondansetron (ZOFRAN) tablet 4 mg, 4 mg, Oral, Q6H PRN, Melissa Husain,  MD, 4 mg at 12/11/19 2111  •  phenazopyridine (PYRIDIUM) tablet 200 mg, 200 mg, Oral, BID PRN, Melissa Husain MD  •  sodium chloride nasal spray 2 spray, 2 spray, Each Nare, PRN, Melissa Husain MD  •  traZODone (DESYREL) tablet 50 mg, 50 mg, Oral, Nightly PRN, Melissa Husain MD, 50 mg at 12/12/19 2126    ASSESSMENT & PLAN:      Opioid use disorder, severe, dependence (CMS/HCC)  - Continue clonidine detox  - Start short Subutex detox      Methamphetamine use disorder, severe (CMS/HCC)  - Supportive treatment      PID (pelvic inflammatory disease)  - Continue Flagyl and Doxycycline      Tobacco use disorder  - Encouraged patient avoid and quit tobacco use. The patient states she is not ready to quit yet      THC use disorder  - Supportive treatment    Suicide precautions: Suicide precuation Level 4 (q30 min checks)    Behavioral Health Treatment Plan and Problem List: I have reviewed and approved the Behavioral Health Treatment Plan and Problem list.  The patient has had a chance to review and agrees with the treatment plan.     Clinician:  Melissa Husain MD  12/13/19  12:09 PM

## 2019-12-14 PROCEDURE — 99233 SBSQ HOSP IP/OBS HIGH 50: CPT | Performed by: PSYCHIATRY & NEUROLOGY

## 2019-12-14 RX ORDER — MIRTAZAPINE 15 MG/1
7.5 TABLET, FILM COATED ORAL NIGHTLY
Status: DISCONTINUED | OUTPATIENT
Start: 2019-12-14 | End: 2019-12-16 | Stop reason: HOSPADM

## 2019-12-14 RX ADMIN — METRONIDAZOLE 500 MG: 250 TABLET ORAL at 21:16

## 2019-12-14 RX ADMIN — HYDROXYZINE HYDROCHLORIDE 50 MG: 50 TABLET ORAL at 18:11

## 2019-12-14 RX ADMIN — TRAZODONE HYDROCHLORIDE 50 MG: 50 TABLET ORAL at 21:17

## 2019-12-14 RX ADMIN — MIRTAZAPINE 7.5 MG: 15 TABLET, FILM COATED ORAL at 21:17

## 2019-12-14 RX ADMIN — BUPRENORPHINE HCL 2 MG: 2 TABLET SUBLINGUAL at 08:07

## 2019-12-14 RX ADMIN — METRONIDAZOLE 500 MG: 250 TABLET ORAL at 08:07

## 2019-12-14 RX ADMIN — IBUPROFEN 400 MG: 400 TABLET, FILM COATED ORAL at 18:11

## 2019-12-14 RX ADMIN — BUPRENORPHINE HCL 2 MG: 2 TABLET SUBLINGUAL at 21:17

## 2019-12-14 RX ADMIN — DOXYCYCLINE 100 MG: 100 CAPSULE ORAL at 08:07

## 2019-12-14 RX ADMIN — MAGNESIUM HYDROXIDE 10 ML: 2400 SUSPENSION ORAL at 18:11

## 2019-12-14 RX ADMIN — DICYCLOMINE HYDROCHLORIDE 10 MG: 10 CAPSULE ORAL at 18:10

## 2019-12-14 RX ADMIN — DOXYCYCLINE 100 MG: 100 CAPSULE ORAL at 21:16

## 2019-12-14 RX ADMIN — ONDANSETRON HYDROCHLORIDE 4 MG: 4 TABLET, FILM COATED ORAL at 08:45

## 2019-12-14 NOTE — PLAN OF CARE
Problem: Patient Care Overview  Goal: Plan of Care Review  Outcome: Ongoing (interventions implemented as appropriate)  Flowsheets (Taken 12/14/2019 7491)  Plan of Care Reviewed With: patient  Patient Agreement with Plan of Care: agrees

## 2019-12-14 NOTE — NURSING NOTE
Patient was in her room and staff walking by heard patient crying.  This nurse checked on patient and she said after coming in from smoking her lower abdomen started hurting.  Patient given Motrin 400mg, Atarax 50mg, Bently 10mg and MOM because she has not had BM in 3-4 days and she didn't know if was BM pain or her PID.

## 2019-12-15 PROCEDURE — 99233 SBSQ HOSP IP/OBS HIGH 50: CPT | Performed by: PSYCHIATRY & NEUROLOGY

## 2019-12-15 RX ADMIN — METRONIDAZOLE 500 MG: 250 TABLET ORAL at 08:15

## 2019-12-15 RX ADMIN — TRAZODONE HYDROCHLORIDE 50 MG: 50 TABLET ORAL at 21:00

## 2019-12-15 RX ADMIN — MIRTAZAPINE 7.5 MG: 15 TABLET, FILM COATED ORAL at 21:00

## 2019-12-15 RX ADMIN — CYCLOBENZAPRINE 10 MG: 10 TABLET, FILM COATED ORAL at 08:17

## 2019-12-15 RX ADMIN — POLYETHYLENE GLYCOL (3350) 17 G: 17 POWDER, FOR SOLUTION ORAL at 09:59

## 2019-12-15 RX ADMIN — DOXYCYCLINE 100 MG: 100 CAPSULE ORAL at 08:15

## 2019-12-15 RX ADMIN — BUPRENORPHINE HCL 2 MG: 2 TABLET SUBLINGUAL at 08:16

## 2019-12-15 RX ADMIN — DOXYCYCLINE 100 MG: 100 CAPSULE ORAL at 21:00

## 2019-12-15 RX ADMIN — METRONIDAZOLE 500 MG: 250 TABLET ORAL at 21:00

## 2019-12-15 NOTE — PLAN OF CARE
Problem: Patient Care Overview  Goal: Plan of Care Review  Outcome: Ongoing (interventions implemented as appropriate)  Flowsheets (Taken 12/15/2019 2357)  Progress: improving  Plan of Care Reviewed With: patient  Patient Agreement with Plan of Care: agrees  Goal: Individualization and Mutuality  Outcome: Ongoing (interventions implemented as appropriate)  Goal: Discharge Needs Assessment  Outcome: Ongoing (interventions implemented as appropriate)  Goal: Interprofessional Rounds/Family Conf  Outcome: Ongoing (interventions implemented as appropriate)     Problem: Overarching Goals (Adult)  Goal: Adheres to Safety Considerations for Self and Others  Outcome: Ongoing (interventions implemented as appropriate)  Goal: Optimized Coping Skills in Response to Life Stressors  Outcome: Ongoing (interventions implemented as appropriate)  Goal: Develops/Participates in Therapeutic [place] to Support Successful Transition  Outcome: Ongoing (interventions implemented as appropriate)     Problem: Impaired Control (Excessive Substance Use) (Adult)  Goal: Participates in Recovery Program (Excessive Substance Use)  Outcome: Ongoing (interventions implemented as appropriate)     Problem: Social/Occupational/Functional Impairment (Excessive Substance Use) (Adult)  Goal: Improved Social/Occupational/Functional Skills (Excessive Substance Use)  Outcome: Ongoing (interventions implemented as appropriate)     Problem: Sleep Pattern Disturbance (Adult)  Goal: Identify Related Risk Factors and Signs and Symptoms  Outcome: Ongoing (interventions implemented as appropriate)  Goal: Adequate Sleep/Rest  Outcome: Ongoing (interventions implemented as appropriate)

## 2019-12-15 NOTE — PROGRESS NOTES
"INPATIENT PSYCHIATRIC PROGRESS NOTE    Name:  Roro French  :  1980  MRN:  [id number]  Visit Number:  [id number]  Length of stay:  4    SUBJECTIVE    CC/Focus of Exam: opioid withdrawals    INTERVAL HISTORY:  Patient the same today. Tolerated mirtazapine well. Reports constipation today. No other complaints    Depression rating 4/10  Anxiety rating 7/10  Sleep: better  Withdrawal sx: see HPI  Cravin/10    Review of Systems   Constitutional: Negative.    Respiratory: Negative.    Cardiovascular: Negative.    Gastrointestinal: Positive for constipation.   Musculoskeletal: Negative.    Psychiatric/Behavioral: Positive for sleep disturbance.       OBJECTIVE    Temp:  [97.6 °F (36.4 °C)-98.8 °F (37.1 °C)] 97.6 °F (36.4 °C)  Heart Rate:  [] 89  Resp:  [16-18] 18  BP: ()/(65-90) 115/72    MENTAL STATUS EXAM:  Appearance:Casually dressed, good hygeine.   Cooperation:Cooperative  Psychomotor: appropriate  Speech-normal rate, amount.  Mood \"a little better\"   Affect-congruent, appropriate, stable  Thought Content-goal directed, no delusional material present  Thought process-linear, organized.  Suicidality: No SI  Homicidality: No HI  Perception: No AH/VH  Insight-fair   Judgement-fair    Lab Results (last 24 hours)     ** No results found for the last 24 hours. **          Imaging Results (Last 24 Hours)     ** No results found for the last 24 hours. **           ECG/EMG Results (most recent)     Procedure Component Value Units Date/Time    ECG 12 Lead [[id number]] Collected:  19     Updated:  19    Narrative:       Test Reason : Baseline Cardiac Status  Blood Pressure : **/** mmHG  Vent. Rate : 073 BPM     Atrial Rate : 073 BPM     P-R Int : 140 ms          QRS Dur : 078 ms      QT Int : 392 ms       P-R-T Axes : 078 050 063 degrees     QTc Int : 431 ms    Normal sinus rhythm with sinus arrhythmia  Normal ECG  No previous ECGs available  Confirmed by Brando Hunt ([id number]) on " 12/11/2019 7:22:48 PM    Referred By:             Confirmed By:Brando Hunt           ALLERGIES: Patient has no known allergies.      Current Facility-Administered Medications:   •  aluminum-magnesium hydroxide-simethicone (MAALOX MAX) 400-400-40 MG/5ML suspension 15 mL, 15 mL, Oral, Q6H PRN, Melissa Husain MD  •  benzonatate (TESSALON) capsule 100 mg, 100 mg, Oral, TID PRN, Melissa Husain MD  •  benztropine (COGENTIN) tablet 2 mg, 2 mg, Oral, Once PRN **OR** benztropine (COGENTIN) injection 1 mg, 1 mg, Intramuscular, Once PRN, Melissa Husain MD  •  cyclobenzaprine (FLEXERIL) tablet 10 mg, 10 mg, Oral, TID PRN, Melissa Husain MD, 10 mg at 12/15/19 0817  •  dicyclomine (BENTYL) capsule 10 mg, 10 mg, Oral, TID PRN, Melissa Husain MD, 10 mg at 12/14/19 1810  •  doxycycline (MONODOX) capsule 100 mg, 100 mg, Oral, Q12H, Melissa Husain MD, 100 mg at 12/15/19 0815  •  famotidine (PEPCID) tablet 20 mg, 20 mg, Oral, BID PRN, Melissa Husain MD  •  hydrOXYzine (ATARAX) tablet 50 mg, 50 mg, Oral, Q6H PRN, Melissa Husain MD, 50 mg at 12/14/19 1811  •  ibuprofen (ADVIL,MOTRIN) tablet 400 mg, 400 mg, Oral, Q6H PRN, Melissa Husain MD, 400 mg at 12/14/19 1811  •  loperamide (IMODIUM) capsule 2 mg, 2 mg, Oral, Q2H PRN, Melissa Husain MD  •  magnesium hydroxide (MILK OF MAGNESIA) suspension 2400 mg/10mL 10 mL, 10 mL, Oral, Daily PRN, Melissa Husain MD, 10 mL at 12/14/19 1811  •  metroNIDAZOLE (FLAGYL) tablet 500 mg, 500 mg, Oral, Q12H, Melissa Husain MD, 500 mg at 12/15/19 0815  •  mirtazapine (REMERON) tablet 7.5 mg, 7.5 mg, Oral, Nightly, Deep Easley MD, 7.5 mg at 12/14/19 2117  •  ondansetron (ZOFRAN) tablet 4 mg, 4 mg, Oral, Q6H PRN, Melissa Husain MD, 4 mg at 12/14/19 0845  •  phenazopyridine (PYRIDIUM) tablet 200 mg, 200 mg, Oral, BID PRN, Melissa Husain MD  •  polyethylene glycol 3350 powder (packet), 17 g, Oral, BID, Deep Easley MD, 17 g at 12/15/19 0959  •  sodium chloride nasal spray 2 spray, 2 spray, Each Nare,  Lisha TRIPP Mazhar, MD  •  traZODone (DESYREL) tablet 50 mg, 50 mg, Oral, Nightly Lisha TRIPP Mazhar, MD, 50 mg at 12/14/19 2117    ASSESSMENT & PLAN:      Opioid use disorder, severe, dependence (CMS/HCC)  - Continue clonidine detox  - Completed Subutex taper today    Constipation - new  - Pt reports need for enema and med  - Ordered enema & miralax    Unspecified anxiety disorder  -Continue mirtazapine 7.5 mg nightly      Methamphetamine use disorder, severe (CMS/HCC)  - Supportive treatment      PID (pelvic inflammatory disease)  - Continue Flagyl and Doxycycline      Tobacco use disorder  - Encouraged patient avoid and quit tobacco use. The patient states she is not ready to quit yet      THC use disorder  - Supportive treatment    Suicide precautions: Suicide precuation Level 4 (q30 min checks)    Behavioral Health Treatment Plan and Problem List: I have reviewed and approved the Behavioral Health Treatment Plan and Problem list.  The patient has had a chance to review and agrees with the treatment plan.     Clinician:  Deep Easley MD  12/15/19  4:19 PM

## 2019-12-15 NOTE — PLAN OF CARE
Problem: Patient Care Overview  Goal: Plan of Care Review  Outcome: Ongoing (interventions implemented as appropriate)  Flowsheets (Taken 12/15/2019 0400)  Progress: improving  Plan of Care Reviewed With: patient  Patient Agreement with Plan of Care: agrees  Outcome Summary: Patient calm and cooperative, requested to shave, order had been put in on day shift, pt requested meds to sleep, pt rested through the night.

## 2019-12-16 VITALS
TEMPERATURE: 98.3 F | RESPIRATION RATE: 18 BRPM | SYSTOLIC BLOOD PRESSURE: 105 MMHG | WEIGHT: 133.6 LBS | DIASTOLIC BLOOD PRESSURE: 74 MMHG | HEIGHT: 63 IN | OXYGEN SATURATION: 98 % | HEART RATE: 91 BPM | BODY MASS INDEX: 23.67 KG/M2

## 2019-12-16 PROCEDURE — 99238 HOSP IP/OBS DSCHRG MGMT 30/<: CPT | Performed by: PSYCHIATRY & NEUROLOGY

## 2019-12-16 RX ORDER — DOXYCYCLINE 100 MG/1
100 CAPSULE ORAL EVERY 12 HOURS SCHEDULED
Qty: 10 CAPSULE | Refills: 0 | Status: SHIPPED | OUTPATIENT
Start: 2019-12-16 | End: 2019-12-21

## 2019-12-16 RX ORDER — METRONIDAZOLE 500 MG/1
500 TABLET ORAL EVERY 12 HOURS SCHEDULED
Qty: 4 TABLET | Refills: 0 | Status: SHIPPED | OUTPATIENT
Start: 2019-12-16 | End: 2019-12-18

## 2019-12-16 RX ORDER — MIRTAZAPINE 7.5 MG/1
7.5 TABLET, FILM COATED ORAL NIGHTLY
Qty: 30 TABLET | Refills: 0 | Status: SHIPPED | OUTPATIENT
Start: 2019-12-16

## 2019-12-16 RX ADMIN — HYDROXYZINE HYDROCHLORIDE 50 MG: 50 TABLET ORAL at 08:01

## 2019-12-16 RX ADMIN — DOXYCYCLINE 100 MG: 100 CAPSULE ORAL at 08:00

## 2019-12-16 RX ADMIN — METRONIDAZOLE 500 MG: 250 TABLET ORAL at 08:00

## 2019-12-16 RX ADMIN — POLYETHYLENE GLYCOL (3350) 17 G: 17 POWDER, FOR SOLUTION ORAL at 08:00

## 2019-12-16 NOTE — DISCHARGE SUMMARY
"PSYCHIATRIC DISCHARGE SUMMARY     Patient Identification:  Name:  Roro French  Age:  39 y.o.  Sex:  female  :  1980  MRN:  [id number]  Visit Number:  [id number]      Date of Admission:2019   Date of Discharge:  2019    Discharge Diagnosis:  Active Problems:    Opioid use disorder, severe, dependence (CMS/HCC)    Methamphetamine use disorder, severe (CMS/HCC)    PID (pelvic inflammatory disease)    Tobacco use disorder        Admission Diagnosis:  Polysubstance dependence (CMS/HCC) [F19.20]     Hospital Course  Patient is a 39 y.o. female presented with opioid and methamphetamine use and withdrawal symptoms.. The patient was admitted to the [facility] detox recovery unit for safety, further evaluation and treatment.  The patient was also able to take part in individual and group counseling sessions and work on appropriate coping skills.  The patient was started on clonidine and Subutex detox and she was able to complete the detox treatment without any complications.  She reported recently been diagnosed with pelvic inflammatory disease and was continued on the antibiotics prescribed to her.  The patient made steady improvement in her mood and expressed feeling more positive and hopeful about future. Sleep and appetite were improved.  The day of discharge the patient was calm, cooperative and pleasant. Mood was reported to be good, and denied SI/HI/AVH. Also reported no medication side effects.  .      Mental Status Exam upon discharge:   Mood \" good\"   Affect-congruent, appropriate, stable  Thought Content-goal directed, no delusional material present  Thought process-linear, organized.  Suicidality: No SI  Homicidality: No HI  Perception: No AH/VH    Procedures Performed         Consults:   Consults     No orders found from 2019 to 2019.          Pertinent Test Results:   Lab Results (last 7 days)     ** No results found for the last 168 hours. **          Condition on " Discharge:  improved    Vital Signs  Temp:  [97.8 °F (36.6 °C)-98.3 °F (36.8 °C)] 98.3 °F (36.8 °C)  Heart Rate:  [85-96] 85  Resp:  [18] 18  BP: (100-114)/(62-74) 104/71      Discharge Disposition:  Home or Self Care    Discharge Medications:     Discharge Medications      New Medications      Instructions Start Date   doxycycline 100 MG capsule  Commonly known as:  MONODOX  Replaces:  doxycycline 100 MG tablet   100 mg, Oral, Every 12 Hours Scheduled      mirtazapine 7.5 MG tablet  Commonly known as:  REMERON   7.5 mg, Oral, Nightly         Changes to Medications      Instructions Start Date   metroNIDAZOLE 500 MG tablet  Commonly known as:  FLAGYL  What changed:    · when to take this  · additional instructions   500 mg, Oral, Every 12 Hours Scheduled         Stop These Medications    doxycycline 100 MG tablet  Commonly known as:  VIBRAMYICN  Replaced by:  doxycycline 100 MG capsule     ondansetron ODT 4 MG disintegrating tablet  Commonly known as:  ZOFRAN-ODT     phenazopyridine 200 MG tablet  Commonly known as:  PYRIDIUM            Discharge Diet: Regular    Activity at Discharge: As tolerated    Follow-up Appointments    [place]    Test Results Pending at Discharge      Clinician:   Melissa Husain MD  12/16/19  2:20 PM

## 2019-12-16 NOTE — SIGNIFICANT NOTE
Patient left facility with staff from [facility].  Patient reports feeling better, expresses desire to maintain sobriety. She denies any suicidal or homicidal ideation. Patient oriented x 4, no delusional content noted.

## 2019-12-16 NOTE — PLAN OF CARE
Problem: Patient Care Overview  Goal: Plan of Care Review  Outcome: Ongoing (interventions implemented as appropriate)  Flowsheets  Taken 12/16/2019 0056  Progress: improving  Outcome Summary: Pt reports she will be going to [facility] at discharge. Pt calm and cooperative and participated in group. Reported her craving an 8 and withdrawals of leg crmps sweats and body aches  Taken 12/15/2019 1955  Plan of Care Reviewed With: patient  Patient Agreement with Plan of Care: agrees

## 2023-08-04 ENCOUNTER — HOSPITAL ENCOUNTER (OUTPATIENT)
Dept: CARDIOLOGY | Facility: HOSPITAL | Age: 43
Discharge: HOME | End: 2023-08-04
Payer: MEDICARE

## 2023-08-04 VITALS — WEIGHT: 154.76 LBS | BODY MASS INDEX: 26.42 KG/M2 | HEIGHT: 64 IN

## 2023-08-04 DIAGNOSIS — M79.89 LEG SWELLING: ICD-10-CM

## 2023-08-04 DIAGNOSIS — R06.02 SHORTNESS OF BREATH: ICD-10-CM

## 2023-08-04 LAB
AORTIC DIMENSIONLESS INDEX: 0.87 (DI)
BH CV ECHO MEAS - ACS: 1.55 CM
BH CV ECHO MEAS - AO MAX PG: 6.9 MMHG
BH CV ECHO MEAS - AO MEAN PG: 4 MMHG
BH CV ECHO MEAS - AO ROOT DIAM: 2.46 CM
BH CV ECHO MEAS - AO V2 MAX: 131 CM/SEC
BH CV ECHO MEAS - AO V2 VTI: 26.7 CM
BH CV ECHO MEAS - EDV(CUBED): 75.2 ML
BH CV ECHO MEAS - EF_3D-VOL: 58 %
BH CV ECHO MEAS - ESV(CUBED): 29.1 ML
BH CV ECHO MEAS - FS: 27.1 %
BH CV ECHO MEAS - IVS/LVPW: 0.96 CM
BH CV ECHO MEAS - IVSD: 0.76 CM
BH CV ECHO MEAS - LA DIMENSION: 3 CM
BH CV ECHO MEAS - LAT PEAK E' VEL: 13.6 CM/SEC
BH CV ECHO MEAS - LV MASS(C)D: 97.4 GRAMS
BH CV ECHO MEAS - LV MAX PG: 5.2 MMHG
BH CV ECHO MEAS - LV MEAN PG: 3 MMHG
BH CV ECHO MEAS - LV V1 MAX: 114 CM/SEC
BH CV ECHO MEAS - LV V1 VTI: 20 CM
BH CV ECHO MEAS - LVIDD: 4.2 CM
BH CV ECHO MEAS - LVIDS: 3.1 CM
BH CV ECHO MEAS - LVPWD: 0.79 CM
BH CV ECHO MEAS - MED PEAK E' VEL: 11.7 CM/SEC
BH CV ECHO MEAS - MV A MAX VEL: 56.1 CM/SEC
BH CV ECHO MEAS - MV DEC SLOPE: 357.6 CM/SEC2
BH CV ECHO MEAS - MV DEC TIME: 0.22 MSEC
BH CV ECHO MEAS - MV E MAX VEL: 64.9 CM/SEC
BH CV ECHO MEAS - MV E/A: 1.16
BH CV ECHO MEAS - MV MAX PG: 2.8 MMHG
BH CV ECHO MEAS - MV MEAN PG: 1.29 MMHG
BH CV ECHO MEAS - MV P1/2T: 68 MSEC
BH CV ECHO MEAS - MV V2 VTI: 27.3 CM
BH CV ECHO MEAS - MVA(P1/2T): 3.2 CM2
BH CV ECHO MEAS - PA V2 MAX: 104.3 CM/SEC
BH CV ECHO MEAS - RAP SYSTOLE: 8 MMHG
BH CV ECHO MEAS - RV MAX PG: 2.8 MMHG
BH CV ECHO MEAS - RV V1 MAX: 83.2 CM/SEC
BH CV ECHO MEAS - RV V1 VTI: 18 CM
BH CV ECHO MEAS - RVSP: 23.7 MMHG
BH CV ECHO MEAS - TAPSE (>1.6): 2.31 CM
BH CV ECHO MEAS - TR MAX PG: 15.7 MMHG
BH CV ECHO MEAS - TR MAX VEL: 198 CM/SEC
BH CV ECHO MEASUREMENTS AVERAGE E/E' RATIO: 5.13
BH CV STRESS RECOVERY BP: NORMAL MMHG
BH CV STRESS RECOVERY HR: 85 BPM
BH CV XLRA - TDI S': 13.5 CM/SEC
MAXIMAL PREDICTED HEART RATE: 178 BPM
PERCENT MAX PREDICTED HR: 85.96 %
SINUS: 2.7 CM
STRESS BASELINE BP: NORMAL MMHG
STRESS BASELINE HR: 73 BPM
STRESS PERCENT HR: 101 %
STRESS POST ESTIMATED WORKLOAD: 7 METS
STRESS POST EXERCISE DUR MIN: 6 MIN
STRESS POST PEAK BP: NORMAL MMHG
STRESS POST PEAK HR: 153 BPM
STRESS TARGET HR: 151 BPM

## 2023-08-04 PROCEDURE — 93017 CV STRESS TEST TRACING ONLY: CPT

## 2023-08-04 PROCEDURE — 93306 TTE W/DOPPLER COMPLETE: CPT

## 2023-08-11 ENCOUNTER — TELEPHONE (OUTPATIENT)
Dept: CARDIOLOGY | Facility: CLINIC | Age: 43
End: 2023-08-11
Payer: MEDICARE

## 2023-08-11 NOTE — TELEPHONE ENCOUNTER
STRESS/ECHO  Pt notified of no acute findings. Provider will discuss results at f/u. Pt reminded of appt date and time.  ----- Message from Flora Salazar MA sent at 8/10/2023  3:57 PM EDT -----    ----- Message -----  From: Mj Mondragon APRN  Sent: 8/10/2023  12:48 PM EDT  To: Flora Salazar MA    Patient was found to have a normal stress test with no evidence of ischemia.  Keep follow-up.

## 2023-11-21 ENCOUNTER — OFFICE VISIT (OUTPATIENT)
Dept: CARDIOLOGY | Facility: CLINIC | Age: 43
End: 2023-11-21
Payer: MEDICARE

## 2023-11-21 VITALS
DIASTOLIC BLOOD PRESSURE: 73 MMHG | WEIGHT: 158.2 LBS | BODY MASS INDEX: 27.01 KG/M2 | OXYGEN SATURATION: 98 % | HEART RATE: 73 BPM | HEIGHT: 64 IN | SYSTOLIC BLOOD PRESSURE: 110 MMHG

## 2023-11-21 DIAGNOSIS — M79.89 LEG SWELLING: ICD-10-CM

## 2023-11-21 DIAGNOSIS — R06.02 SHORTNESS OF BREATH: Primary | ICD-10-CM

## 2023-11-21 PROCEDURE — 1160F RVW MEDS BY RX/DR IN RCRD: CPT | Performed by: NURSE PRACTITIONER

## 2023-11-21 PROCEDURE — 1159F MED LIST DOCD IN RCRD: CPT | Performed by: NURSE PRACTITIONER

## 2023-11-21 PROCEDURE — 99214 OFFICE O/P EST MOD 30 MIN: CPT | Performed by: NURSE PRACTITIONER

## 2023-11-21 NOTE — PROGRESS NOTES
Subjective     Roro French is a 43 y.o. female who presents to [facility] for testing follow up .    CHIEF COMPLIANT  Chief Complaint   Patient presents with    testing follow up        Active Problems:  Problem List Items Addressed This Visit    None  Visit Diagnoses       Shortness of breath    -  Primary    Relevant Orders    Ambulatory Referral to Pulmonology    Leg swelling                HPI  HPI  Ms. Roro French 43-year-old female patient who is being followed up today for testing results.  Patient did go under echocardiogram that showed an ejection fraction of 50 to 55%, normal diastolic function, trivial MR and TR and pulmonary artery systolic pressures in the mid 20s.  Her stress test showed less than 1 mm of upsloping ST segment depression which she was not felt to meet the diagnostic criteria for ischemia.  She had a adequate functional capacity without chest pain.  No exercise-induced dysrhythmia.    Patient continues to have shortness of breath is intermittent comes and goes.  She says at times she feels like she has to fight for air and other times she is fine.  She has never had a pulmonary work-up.  With normal testing from the cardiovascular standpoint I do think it would be reasonable to refer to pulmonology.  She did say that she had a recent chest x-ray for the urgent care that was negative.  Neck  She says that her leg swelling went down and that has not reoccurred.  She has discontinued the Lasix and potassium.    Other than the shortness of breath she denies any angina anginal equivalent symptoms.  She denies any chest pain, lower extremity edema, syncope, PND, orthopnea, or strokelike symptoms.  PRIOR MEDS  Current Outpatient Medications on File Prior to Visit   Medication Sig Dispense Refill    buprenorphine-naloxone (SUBOXONE) 8-2 MG per SL tablet Place 1 tablet under the tongue Daily.      [DISCONTINUED] furosemide (LASIX) 20 MG tablet Take 1 tablet by mouth As Needed.      [DISCONTINUED]  potassium chloride (MICRO-K) 10 MEQ CR capsule Take 1 capsule by mouth As Needed. With Lasix       No current facility-administered medications on file prior to visit.       ALLERGIES  Patient has no known allergies.    HISTORY  Past Medical History:   Diagnosis Date    Asthma Shortness of breath    PID (pelvic inflammatory disease)     Substance abuse     Withdrawal symptoms, drug or narcotic        Social History     Socioeconomic History    Marital status: Legally    Tobacco Use    Smoking status: Every Day     Types: Electronic Cigarette    Smokeless tobacco: Never    Tobacco comments:     vapes   Substance and Sexual Activity    Alcohol use: Never    Drug use: Not Currently     Types: Methamphetamines, Other     Comment: suboxone    Sexual activity: Yes     Partners: Male     Birth control/protection: Tubal ligation       Family History   Problem Relation Age of Onset    Heart attack Mother     Heart disease Mother     Heart failure Mother     Cancer Father     Drug abuse Sister     Drug abuse Brother     Heart attack Maternal Grandfather     Heart disease Maternal Grandfather     Heart failure Maternal Grandfather        Review of Systems   Constitutional: Negative.  Negative for chills, fatigue and fever.   HENT: Negative.  Negative for congestion, rhinorrhea and sore throat.    Eyes: Negative.  Negative for visual disturbance.   Respiratory:  Positive for shortness of breath. Negative for apnea and chest tightness.    Cardiovascular: Negative.  Negative for chest pain, palpitations and leg swelling.   Gastrointestinal: Negative.  Negative for constipation, diarrhea and nausea.   Musculoskeletal: Negative.  Negative for arthralgias, back pain and neck pain.   Allergic/Immunologic: Negative.  Negative for environmental allergies and food allergies.   Neurological: Negative.  Negative for dizziness, syncope, weakness and light-headedness.   Hematological: Negative.  Does not bruise/bleed easily.  "  Psychiatric/Behavioral: Negative.  Negative for sleep disturbance.        Objective     VITALS: /73 (BP Location: Left arm, Patient Position: Sitting, Cuff Size: Adult)   Pulse 73   Ht 162 cm (63.78\")   Wt 71.8 kg (158 lb 3.2 oz)   SpO2 98%   BMI 27.34 kg/m²     LABS:   Lab Results (most recent)       None            IMAGING:   No Images in the past 120 days found..    EXAM:  Physical Exam  Vitals and nursing note reviewed.   Constitutional:       Appearance: She is well-developed.   HENT:      Head: Normocephalic.   Neck:      Thyroid: No thyroid mass.      Vascular: No carotid bruit or JVD.      Trachea: Trachea and phonation normal.   Cardiovascular:      Rate and Rhythm: Normal rate and regular rhythm.      Pulses:           Radial pulses are 2+ on the right side and 2+ on the left side.        Posterior tibial pulses are 2+ on the right side and 2+ on the left side.      Heart sounds: Normal heart sounds. No murmur heard.     No friction rub. No gallop.   Pulmonary:      Effort: Pulmonary effort is normal. No respiratory distress.      Breath sounds: Normal breath sounds. No wheezing or rales.   Musculoskeletal:         General: No swelling. Normal range of motion.      Cervical back: Neck supple.   Skin:     General: Skin is warm and dry.      Capillary Refill: Capillary refill takes less than 2 seconds.      Findings: No rash.   Neurological:      Mental Status: She is alert and oriented to person, place, and time.   Psychiatric:         Speech: Speech normal.         Behavior: Behavior normal.         Thought Content: Thought content normal.         Judgment: Judgment normal.         Procedure   Procedures       Assessment & Plan    Diagnosis Plan   1. Shortness of breath  Ambulatory Referral to Pulmonology      2. Leg swelling        1.  Due to patient's shortness of breath with a negative cardiac work-up I do think it is appropriate to have her evaluated by pulmonology.  We will place a " referral to Minerva ORTIZ  2.  Patient's leg swelling has resolved and not reoccurred.  We will continue to monitor.  3.  We did review her cardiac testing in detail and all questions were answered.  4.  Informed of signs and symptoms of ACS and advised to seek emergent treatment for any new worsening symptoms.  Patient also advised sooner follow-up as needed.  Also advised to follow-up with family doctor as needed  This note is dictated utilizing voice recognition software.  Although this record has been proof read, transcriptional errors may still be present. If questions occur regarding the content of this record please do not hesitate to call our office.  I have reviewed and confirmed the accuracy of the ROS as documented by the MA/LPN/RN DIANA Ennis    Return in about 1 year (around 11/21/2024), or if symptoms worsen or fail to improve.    Diagnoses and all orders for this visit:    1. Shortness of breath (Primary)  -     Ambulatory Referral to Pulmonology    2. Leg swelling        Roro French  reports that she has been smoking electronic cigarette. She has never used smokeless tobacco.. I have educated her on the risk of diseases from using tobacco products.       MEDS ORDERED DURING VISIT:  No orders of the defined types were placed in this encounter.          This document has been electronically signed by Mj Mondragon Jr., DIANA  November 21, 2023 14:45 EST

## 2024-01-25 ENCOUNTER — TELEPHONE (OUTPATIENT)
Dept: PULMONOLOGY | Facility: CLINIC | Age: 44
End: 2024-01-25

## 2024-01-25 ENCOUNTER — OFFICE VISIT (OUTPATIENT)
Dept: PULMONOLOGY | Facility: CLINIC | Age: 44
End: 2024-01-25
Payer: MEDICARE

## 2024-01-25 VITALS
OXYGEN SATURATION: 97 % | DIASTOLIC BLOOD PRESSURE: 62 MMHG | BODY MASS INDEX: 27.31 KG/M2 | WEIGHT: 160 LBS | HEART RATE: 83 BPM | HEIGHT: 64 IN | SYSTOLIC BLOOD PRESSURE: 98 MMHG

## 2024-01-25 DIAGNOSIS — R06.02 SHORTNESS OF BREATH: Primary | ICD-10-CM

## 2024-01-25 DIAGNOSIS — F17.219 CIGARETTE NICOTINE DEPENDENCE WITH NICOTINE-INDUCED DISORDER: ICD-10-CM

## 2024-01-25 NOTE — PROGRESS NOTES
New Pulmonary Patient Office Visit      Patient Name: Roro French    Referring Physician: Mj Mondragon APRN    Chief Complaint:    Chief Complaint   Patient presents with    Breathing Problem    Consult     Patient states she has had intermittent SOB first noticed after dx with COVID-19 in        History of Present Illness: Roro French is a 43 y.o. female who is here today to establish care with Pulmonary for shortness of breath. She is breathing well currently but notes that her last episode of breathing difficulties occurred in May 2023 with associated leg swelling (now resolved). She reports having a CXR by her PCP at that time and was told that her CXR was normal. She has stopped smoking cigarettes and has since started vaping and feels that her dyspnea has been worse since vaping.    Duration: Since having COVID a couple of years ago (did not require hospitalization)  Timing: Intermittent  Associated Symptoms: No current cough or wheezing, no fevers, no hemoptysis, no unintended weight loss  Modifying Factors: Worse in hot weather  Supplemental Oxygen: No  Cardiac History: No    Subjective      Review of Systems:   Review of Systems   Constitutional:  Negative for fever and unexpected weight change.   Respiratory:  Positive for shortness of breath. Negative for cough and wheezing.    Cardiovascular:  Negative for chest pain and leg swelling.   Allergic/Immunologic: Negative for environmental allergies.        Past Medical History:   Past Medical History:   Diagnosis Date    Asthma Shortness of breath    PID (pelvic inflammatory disease)     Substance abuse     Withdrawal symptoms, drug or narcotic        Past Surgical History:   Past Surgical History:   Procedure Laterality Date     SECTION  2002    TUBAL ABDOMINAL LIGATION         Family History:   Family History   Problem Relation Age of Onset    Heart attack Mother     Heart disease Mother     Heart failure Mother     Cancer  "Father     Drug abuse Sister     Drug abuse Brother     Heart attack Maternal Grandfather     Heart disease Maternal Grandfather     Heart failure Maternal Grandfather        Social History:   Social History     Socioeconomic History    Marital status: Legally    Tobacco Use    Smoking status: Every Day     Types: Electronic Cigarette     Passive exposure: Current    Smokeless tobacco: Never    Tobacco comments:     vapes   Vaping Use    Vaping Use: Every day    Substances: Nicotine    Devices: Disposable   Substance and Sexual Activity    Alcohol use: Never    Drug use: Not Currently     Types: Methamphetamines, Other     Comment: suboxone    Sexual activity: Yes     Partners: Male     Birth control/protection: Tubal ligation     Medications:     Current Outpatient Medications:     buprenorphine-naloxone (SUBOXONE) 8-2 MG per SL tablet, Place 1 tablet under the tongue Daily., Disp: , Rfl:     Allergies:   No Known Allergies    Objective     Physical Exam:  Vital Signs:   Vitals:    01/25/24 1338   BP: 98/62   Pulse: 83   SpO2: 97%   Weight: 72.6 kg (160 lb)   Height: 162.6 cm (64\")     Body mass index is 27.46 kg/m².    Physical Exam  Vitals reviewed.   Constitutional:       General: She is not in acute distress.     Appearance: She is not toxic-appearing.   HENT:      Head: Normocephalic and atraumatic.      Mouth/Throat:      Mouth: Mucous membranes are moist.   Eyes:      Conjunctiva/sclera: Conjunctivae normal.   Cardiovascular:      Rate and Rhythm: Normal rate.      Heart sounds: Normal heart sounds.   Pulmonary:      Effort: Pulmonary effort is normal.      Breath sounds: Normal breath sounds.   Abdominal:      General: There is no distension.      Palpations: Abdomen is soft.   Musculoskeletal:         General: No swelling.      Cervical back: Neck supple.   Skin:     General: Skin is warm and dry.      Findings: No rash.   Neurological:      General: No focal deficit present.      Mental Status: " She is alert and oriented to person, place, and time.   Psychiatric:         Mood and Affect: Mood normal.         Behavior: Behavior normal.       Results Review:   Results for orders placed during the hospital encounter of 08/04/23    Adult Transthoracic Echo Complete W/ Cont if Necessary Per Protocol    Interpretation Summary  Technically adequate to good technical quality.    1.  LV size, function, wall motion, and wall thickness are normal.  Visually estimated ejection fraction is 50 to 55%.  3D ejection fraction is 58%.  Normal LV diastolic function and filling pressures.  The atria and right ventricle are normal.  No septal defect or intracavitary mass or thrombus.    2.  Valves are morphologically and physiologically normal with no stenotic lesions, valve associated masses or thrombi, or hemodynamically significant regurgitation.  There is trivial MR and TR.    3.  No pericardial or great vessel pathology.    4.  Pulmonary artery systolic pressures are estimated in the mid 20s.    Assessment / Plan      Assessment/Plan:    Diagnoses and all orders for this visit:    1. Shortness of breath (Primary)  -     Complete PFT - Pre & Post Bronchodilator; Future  Negative cardiology work up recently. ?mild intermittent asthma. Currently not symptomatic. Further assess with full PFTs.     2. Cigarette nicotine dependence with nicotine-induced disorder  -     nicotine polacrilex (Nicorette) 4 MG lozenge; Dissolve 1 lozenge in the mouth As Needed for Smoking Cessation.  Dispense: 72 each; Refill: 2  Complete cessation of smoking and vaping is advised. Begin use of nicotine replacement to aid in cessation efforts.       Follow Up:   Return in about 3 months (around 4/25/2024) for Recheck.  The patient was counseled on diagnostic results, risks and benefits of treatment options, risk factor modifications and the importance of treatment compliance. The patient was advised to contact the clinic with concerns or worsening  symptoms.     Minerva Burnett, DIANA  Pulmonary Medicine [place]

## 2024-03-14 DIAGNOSIS — R06.02 SHORTNESS OF BREATH: ICD-10-CM

## 2024-09-27 NOTE — PROGRESS NOTES
"INPATIENT PSYCHIATRIC PROGRESS NOTE    Name:  Roro French  :  1980  MRN:  [id number]  Visit Number:  [id number]  Length of stay:  3    SUBJECTIVE    CC/Focus of Exam: opioid withdrawals    INTERVAL HISTORY:  First time seeing patient.  Chart, notes, vitals, labs and EKG personally reviewed.    Patient reports first day of significant improvement today.  Majority of withdrawal symptoms are well controlled.  Major complaint is insomnia and some anxiety, for which mirtazapine as previously worked well.    Depression rating /10  Anxiety rating /10  Sleep: poor  Withdrawal sx: see HPI  Cravin/10    Review of Systems   Constitutional: Negative.    Respiratory: Negative.    Cardiovascular: Negative.    Gastrointestinal: Negative.    Musculoskeletal: Negative.    Psychiatric/Behavioral: Positive for sleep disturbance.       OBJECTIVE    Temp:  [97.3 °F (36.3 °C)-98.7 °F (37.1 °C)] 97.9 °F (36.6 °C)  Heart Rate:  [] 84  Resp:  [16-18] 16  BP: ()/(58-82) 108/74    MENTAL STATUS EXAM:  Appearance:Casually dressed, good hygeine.   Cooperation:Cooperative  Psychomotor: appropriate  Speech-normal rate, amount.  Mood \"anxious\"   Affect-congruent, appropriate, stable  Thought Content-goal directed, no delusional material present  Thought process-linear, organized.  Suicidality: No SI  Homicidality: No HI  Perception: No AH/VH  Insight-fair   Judgement-fair    Lab Results (last 24 hours)     ** No results found for the last 24 hours. **          Imaging Results (Last 24 Hours)     ** No results found for the last 24 hours. **           ECG/EMG Results (most recent)     Procedure Component Value Units Date/Time    ECG 12 Lead [[id number]] Collected:  19     Updated:  19    Narrative:       Test Reason : Baseline Cardiac Status  Blood Pressure : **/** mmHG  Vent. Rate : 073 BPM     Atrial Rate : 073 BPM     P-R Int : 140 ms          QRS Dur : 078 ms      QT Int : 392 ms       P-R-T " Axes : 078 050 063 degrees     QTc Int : 431 ms    Normal sinus rhythm with sinus arrhythmia  Normal ECG  No previous ECGs available  Confirmed by Brando Hunt ([id number]) on 2019 7:22:48 PM    Referred By:             Confirmed By:Brando Hunt           ALLERGIES: Patient has no known allergies.      Current Facility-Administered Medications:   •  aluminum-magnesium hydroxide-simethicone (MAALOX MAX) 400-400-40 MG/5ML suspension 15 mL, 15 mL, Oral, Q6H PRN, Melissa Husain MD  •  benzonatate (TESSALON) capsule 100 mg, 100 mg, Oral, TID PRN, Melissa Husain MD  •  benztropine (COGENTIN) tablet 2 mg, 2 mg, Oral, Once PRN **OR** benztropine (COGENTIN) injection 1 mg, 1 mg, Intramuscular, Once PRN, Melissa Husain MD  •  [COMPLETED] buprenorphine (SUBUTEX) SL tablet 2 mg, 2 mg, Sublingual, BID, 2 mg at 19 **FOLLOWED BY** buprenorphine (SUBUTEX) SL tablet 2 mg, 2 mg, Sublingual, BID, 2 mg at 19 0807 **FOLLOWED BY** [START ON 12/15/2019] buprenorphine (SUBUTEX) SL tablet 2 mg, 2 mg, Sublingual, Daily, Melissa Husain MD  •  [] cloNIDine (CATAPRES) tablet 0.1 mg, 0.1 mg, Oral, 4x Daily PRN **FOLLOWED BY** [] cloNIDine (CATAPRES) tablet 0.1 mg, 0.1 mg, Oral, TID PRN **FOLLOWED BY** [] cloNIDine (CATAPRES) tablet 0.1 mg, 0.1 mg, Oral, BID PRN **FOLLOWED BY** cloNIDine (CATAPRES) tablet 0.1 mg, 0.1 mg, Oral, Daily PRN, Melissa Husain MD  •  cyclobenzaprine (FLEXERIL) tablet 10 mg, 10 mg, Oral, TID PRN, Melissa Husain MD, 10 mg at 19  •  dicyclomine (BENTYL) capsule 10 mg, 10 mg, Oral, TID PRN, Melissa Husain MD, 10 mg at 19  •  doxycycline (MONODOX) capsule 100 mg, 100 mg, Oral, Q12H, Melissa Husain MD, 100 mg at 19  •  famotidine (PEPCID) tablet 20 mg, 20 mg, Oral, BID PRN, Melissa Husain MD  •  hydrOXYzine (ATARAX) tablet 50 mg, 50 mg, Oral, Q6H PRN, Melissa Husain MD, 50 mg at 19  •  ibuprofen (ADVIL,MOTRIN) tablet 400 mg, 400 mg, Oral, Q6H  PRN, Melissa Husain MD, 400 mg at 12/13/19 2116  •  loperamide (IMODIUM) capsule 2 mg, 2 mg, Oral, Q2H PRN, Melissa Husain MD  •  magnesium hydroxide (MILK OF MAGNESIA) suspension 2400 mg/10mL 10 mL, 10 mL, Oral, Daily PRN, Melissa Husain MD  •  metroNIDAZOLE (FLAGYL) tablet 500 mg, 500 mg, Oral, Q12H, Melissa Husain MD, 500 mg at 12/14/19 0807  •  mirtazapine (REMERON) tablet 7.5 mg, 7.5 mg, Oral, Nightly, Deep Easley MD  •  ondansetron (ZOFRAN) tablet 4 mg, 4 mg, Oral, Q6H PRN, Melissa Husain MD, 4 mg at 12/14/19 0845  •  phenazopyridine (PYRIDIUM) tablet 200 mg, 200 mg, Oral, BID PRN, Melissa Husain MD  •  sodium chloride nasal spray 2 spray, 2 spray, Each Nare, PRN, Melissa Husain MD  •  traZODone (DESYREL) tablet 50 mg, 50 mg, Oral, Nightly PRN, Melissa Hsuain MD, 50 mg at 12/12/19 2126    ASSESSMENT & PLAN:      Opioid use disorder, severe, dependence (CMS/HCC)  - Continue clonidine detox  -Continue Subutex detox    Specified anxiety disorder  -Begin mirtazapine 7.5 mg nightly      Methamphetamine use disorder, severe (CMS/HCC)  - Supportive treatment      PID (pelvic inflammatory disease)  - Continue Flagyl and Doxycycline      Tobacco use disorder  - Encouraged patient avoid and quit tobacco use. The patient states she is not ready to quit yet      THC use disorder  - Supportive treatment        Suicide precautions: Suicide precuation Level 4 (q30 min checks)    Behavioral Health Treatment Plan and Problem List: I have reviewed and approved the Behavioral Health Treatment Plan and Problem list.  The patient has had a chance to review and agrees with the treatment plan.     Clinician:  Deep Easley MD  12/14/19  1:02 PM   Thank you for visiting [facility] Emergency Department.      Please know that no emergency visit is complete without follow-up with your primary care provider in 1 week.  Please bring copies of all discharge papers and results and show to your doctor.      I appreciated your patience and hope you feel better soon.   Return to ER immediately if you develop fevers, chills, chest pain, shortness of breath, worsening dizziness, and/or any concerning symptoms.    Dizziness  Dizziness is a common problem. It makes you feel unsteady or light-headed. You may feel like you're about to faint. Dizziness can lead to getting hurt if you stumble or fall.    It's more common to feel dizzy if you're an older adult. Many things can cause you to feel dizzy. These include:  Medicines.  Dehydration. This is when there's not enough water in your body.  Illness.  Follow these instructions at home:  Eating and drinking    A person drinking water from a glass.  Drink enough fluid to keep your pee (urine) pale yellow.  This helps keep you from getting dehydrated.  Try to drink more clear fluids, such as water.  Do not drink alcohol.  Try to limit how much caffeine you take in.  Try to limit how much salt, also called sodium, you take in.  Activity    Try not to make quick movements.  Stand up slowly from sitting in a chair. Steady yourself until you feel okay.  In the morning, first sit up on the side of the bed. When you feel okay, hold onto something and slowly stand up. Do this until you know that your balance is okay.  If you need to  one place for a long time, move your legs often. Tighten and relax the muscles in your legs while you're standing.  Do not drive or use machines if you feel dizzy.  Avoid bending down if you feel dizzy. Place items in your home so you can reach them without leaning over.  Lifestyle    Do not smoke, vape, or use products with nicotine or tobacco in them. If you need help quitting, talk with your health care provider.  Try to lower your stress level. You can do this by using methods like yoga or meditation. Talk with your provider if you need help.  General instructions    Watch your dizziness for any changes.  Take your medicines only as told by your provider. Talk with your provider if you think you're dizzy because of a medicine you're taking.  Tell a friend or a family member that you're feeling dizzy. If they spot any changes in your behavior, have them call your provider.  Contact a health care provider if:  Your dizziness doesn't go away, or you have new symptoms.  Your dizziness gets worse.  You feel like you may vomit.  You have trouble hearing.  You have a fever.  You have neck pain or a stiff neck.  You fall or get hurt.  Get help right away if:  You vomit each time you eat or drink.  You have watery poop and can't eat or drink.  You have trouble talking, walking, swallowing, or using your arms, hands, or legs.  You feel very weak.  You're bleeding.  You're not thinking clearly, or you have trouble forming sentences. A friend or family member may spot this.  Your vision changes, or you get a very bad headache.  These symptoms may be an emergency. Call 911 right away.  Do not wait to see if the symptoms will go away.  Do not drive yourself to the hospital.  This information is not intended to replace advice given to you by your health care provider. Make sure you discuss any questions you have with your health care provider.

## 2024-11-21 ENCOUNTER — TELEPHONE (OUTPATIENT)
Dept: CARDIOLOGY | Facility: CLINIC | Age: 44
End: 2024-11-21
Payer: MEDICARE

## 2024-11-21 NOTE — TELEPHONE ENCOUNTER
Relay    Calling to see if you would like to get your appointment rescheduled that you missed with  this afternoon. Thank you